# Patient Record
Sex: FEMALE | Race: WHITE | NOT HISPANIC OR LATINO | Employment: UNEMPLOYED | URBAN - METROPOLITAN AREA
[De-identification: names, ages, dates, MRNs, and addresses within clinical notes are randomized per-mention and may not be internally consistent; named-entity substitution may affect disease eponyms.]

---

## 2018-05-31 ENCOUNTER — TELEPHONE (OUTPATIENT)
Dept: INTERNAL MEDICINE | Facility: HOSPITAL | Age: 78
End: 2018-05-31

## 2018-05-31 DIAGNOSIS — E11.9 TYPE 2 DIABETES MELLITUS WITHOUT COMPLICATION, WITHOUT LONG-TERM CURRENT USE OF INSULIN (CMS/HCC): ICD-10-CM

## 2018-05-31 DIAGNOSIS — I10 ESSENTIAL HYPERTENSION: Primary | ICD-10-CM

## 2018-05-31 RX ORDER — METFORMIN HYDROCHLORIDE 1000 MG/1
1000 TABLET ORAL 2 TIMES DAILY
Qty: 180 TABLET | Refills: 1 | Status: SHIPPED | OUTPATIENT
Start: 2018-05-31 | End: 2018-08-16 | Stop reason: SDUPTHER

## 2018-05-31 RX ORDER — METOPROLOL TARTRATE 25 MG/1
12.5 TABLET, FILM COATED ORAL 2 TIMES DAILY
COMMUNITY
Start: 2018-01-15 | End: 2018-07-19 | Stop reason: SDUPTHER

## 2018-05-31 RX ORDER — LISINOPRIL 40 MG/1
40 TABLET ORAL DAILY
COMMUNITY
Start: 2017-06-20 | End: 2018-05-31 | Stop reason: SDUPTHER

## 2018-05-31 RX ORDER — LISINOPRIL 40 MG/1
40 TABLET ORAL DAILY
Qty: 90 TABLET | Refills: 1 | Status: SHIPPED | OUTPATIENT
Start: 2018-05-31 | End: 2018-08-16 | Stop reason: SDUPTHER

## 2018-05-31 RX ORDER — ASPIRIN 81 MG/1
81 TABLET ORAL DAILY
COMMUNITY
End: 2018-08-28 | Stop reason: SDUPTHER

## 2018-05-31 RX ORDER — METFORMIN HYDROCHLORIDE 1000 MG/1
1000 TABLET ORAL 2 TIMES DAILY
COMMUNITY
Start: 2017-06-20 | End: 2018-05-31 | Stop reason: SDUPTHER

## 2018-05-31 RX ORDER — ATORVASTATIN CALCIUM 40 MG/1
40 TABLET, FILM COATED ORAL DAILY
COMMUNITY
Start: 2017-06-20 | End: 2018-07-18 | Stop reason: SDUPTHER

## 2018-05-31 RX ORDER — PNV NO.95/FERROUS FUM/FOLIC AC 28MG-0.8MG
1 TABLET ORAL DAILY
COMMUNITY
Start: 2016-06-16 | End: 2018-08-16 | Stop reason: SDUPTHER

## 2018-05-31 RX ORDER — AMLODIPINE BESYLATE 5 MG/1
5 TABLET ORAL DAILY
COMMUNITY
Start: 2017-12-14 | End: 2018-07-18 | Stop reason: SDUPTHER

## 2018-08-15 DIAGNOSIS — I10 HYPERTENSION, UNSPECIFIED TYPE: ICD-10-CM

## 2018-08-15 RX ORDER — SITAGLIPTIN 50 MG/1
TABLET, FILM COATED ORAL
Qty: 30 TABLET | Refills: 0 | OUTPATIENT
Start: 2018-08-15

## 2018-08-16 DIAGNOSIS — E11.9 TYPE 2 DIABETES MELLITUS WITHOUT COMPLICATION, WITHOUT LONG-TERM CURRENT USE OF INSULIN (CMS/HCC): ICD-10-CM

## 2018-08-16 DIAGNOSIS — I10 HYPERTENSION, UNSPECIFIED TYPE: ICD-10-CM

## 2018-08-16 DIAGNOSIS — I10 ESSENTIAL HYPERTENSION: ICD-10-CM

## 2018-08-16 RX ORDER — METOPROLOL TARTRATE 25 MG/1
12.5 TABLET, FILM COATED ORAL 2 TIMES DAILY
Qty: 60 TABLET | Refills: 0 | OUTPATIENT
Start: 2018-08-16

## 2018-08-16 RX ORDER — PNV NO.95/FERROUS FUM/FOLIC AC 28MG-0.8MG
1 TABLET ORAL DAILY
Qty: 60 TABLET | Refills: 0 | Status: SHIPPED | OUTPATIENT
Start: 2018-08-16 | End: 2018-10-10 | Stop reason: SDUPTHER

## 2018-08-16 RX ORDER — AMLODIPINE BESYLATE 5 MG/1
5 TABLET ORAL DAILY
Qty: 60 TABLET | Refills: 0 | Status: SHIPPED | OUTPATIENT
Start: 2018-08-16 | End: 2018-10-14 | Stop reason: SDUPTHER

## 2018-08-16 RX ORDER — METOPROLOL TARTRATE 25 MG/1
12.5 TABLET, FILM COATED ORAL 2 TIMES DAILY
Qty: 60 TABLET | Refills: 0 | Status: SHIPPED | OUTPATIENT
Start: 2018-08-16 | End: 2018-10-10 | Stop reason: SDUPTHER

## 2018-08-16 RX ORDER — LISINOPRIL 40 MG/1
40 TABLET ORAL DAILY
Qty: 60 TABLET | Refills: 0 | Status: SHIPPED | OUTPATIENT
Start: 2018-08-16 | End: 2018-10-10 | Stop reason: SDUPTHER

## 2018-08-16 RX ORDER — ATORVASTATIN CALCIUM 40 MG/1
40 TABLET, FILM COATED ORAL
Qty: 60 TABLET | Refills: 0 | Status: SHIPPED | OUTPATIENT
Start: 2018-08-16 | End: 2018-10-10 | Stop reason: SDUPTHER

## 2018-08-16 RX ORDER — METFORMIN HYDROCHLORIDE 1000 MG/1
1000 TABLET ORAL 2 TIMES DAILY
Qty: 60 TABLET | Refills: 0 | Status: SHIPPED | OUTPATIENT
Start: 2018-08-16 | End: 2018-10-10 | Stop reason: SDUPTHER

## 2018-08-16 NOTE — TELEPHONE ENCOUNTER
Patient has not had OV in > 1 year (5/2017)  Called patient and left message to schedule appointment at her earliest convenience if [facility] still PCP for refills.

## 2018-08-16 NOTE — TELEPHONE ENCOUNTER
Spoke with patient's daughter, Richa Oliveira. Richa states patient has been travelling and has been unable to schedule appointment. Richa states patient is home and can schedule appointment. I transferred Richa to scheduling. Apt scheduled for 10/10/18. Refilled all meds x 2 months.

## 2018-08-28 RX ORDER — ASPIRIN 81 MG/1
TABLET ORAL
Qty: 90 TABLET | Refills: 3 | Status: SHIPPED | OUTPATIENT
Start: 2018-08-28 | End: 2018-10-10 | Stop reason: SDUPTHER

## 2018-09-19 ENCOUNTER — TELEPHONE (OUTPATIENT)
Dept: INTERNAL MEDICINE | Facility: HOSPITAL | Age: 78
End: 2018-09-19

## 2018-09-19 NOTE — TELEPHONE ENCOUNTER
Pts Son in Law (Bharath) is calling because pt needs some dental Procedures done but they can not be done until pt gets a Letter of Necessity so that she can have this procedure done.  She needs a crown placed for her dentures. I explained to him that pt has not been seen in a year and her primary does not know her current health situation in order to generate this letter. Pt does have an upcomming appointment. Please advise.

## 2018-09-24 NOTE — TELEPHONE ENCOUNTER
Pts son (Bharath) called maxine he sated that he is not able to wait that long to get the letter for pt. He is now asking that you give him a call so he can talk to you about this. His number is listed.

## 2018-10-10 ENCOUNTER — OFFICE VISIT (OUTPATIENT)
Dept: INTERNAL MEDICINE | Facility: HOSPITAL | Age: 78
End: 2018-10-10
Attending: NURSE PRACTITIONER
Payer: COMMERCIAL

## 2018-10-10 ENCOUNTER — TELEPHONE (OUTPATIENT)
Dept: INTERNAL MEDICINE | Facility: HOSPITAL | Age: 78
End: 2018-10-10

## 2018-10-10 ENCOUNTER — APPOINTMENT (OUTPATIENT)
Dept: LAB | Facility: HOSPITAL | Age: 78
End: 2018-10-10
Attending: NURSE PRACTITIONER
Payer: COMMERCIAL

## 2018-10-10 VITALS
RESPIRATION RATE: 16 BRPM | WEIGHT: 109 LBS | HEART RATE: 60 BPM | SYSTOLIC BLOOD PRESSURE: 140 MMHG | HEIGHT: 60 IN | DIASTOLIC BLOOD PRESSURE: 62 MMHG | OXYGEN SATURATION: 100 % | TEMPERATURE: 96.2 F | BODY MASS INDEX: 21.4 KG/M2

## 2018-10-10 DIAGNOSIS — E11.9 TYPE 2 DIABETES MELLITUS WITHOUT COMPLICATION, WITHOUT LONG-TERM CURRENT USE OF INSULIN (CMS/HCC): ICD-10-CM

## 2018-10-10 DIAGNOSIS — I10 HYPERTENSION, UNSPECIFIED TYPE: ICD-10-CM

## 2018-10-10 DIAGNOSIS — R92.8 ABNORMAL MAMMOGRAM: ICD-10-CM

## 2018-10-10 DIAGNOSIS — I10 ESSENTIAL HYPERTENSION: Primary | ICD-10-CM

## 2018-10-10 DIAGNOSIS — I25.10 CORONARY ARTERY DISEASE INVOLVING NATIVE CORONARY ARTERY OF NATIVE HEART WITHOUT ANGINA PECTORIS: ICD-10-CM

## 2018-10-10 DIAGNOSIS — Z23 FLU VACCINE NEED: ICD-10-CM

## 2018-10-10 DIAGNOSIS — E11.9 TYPE 2 DIABETES MELLITUS WITHOUT COMPLICATION, WITHOUT LONG-TERM CURRENT USE OF INSULIN (CMS/HCC): Primary | ICD-10-CM

## 2018-10-10 LAB
ALBUMIN/CREAT UR: NORMAL UG/MG
ANION GAP SERPL CALC-SCNC: 10 MEQ/L (ref 3–15)
BUN SERPL-MCNC: 21 MG/DL (ref 8–20)
CALCIUM SERPL-MCNC: 10.2 MG/DL (ref 8.9–10.3)
CHLORIDE SERPL-SCNC: 109 MEQ/L (ref 98–109)
CHOLEST SERPL-MCNC: 112 MG/DL
CO2 SERPL-SCNC: 26 MEQ/L (ref 22–32)
CREAT SERPL-MCNC: 0.9 MG/DL (ref 0.6–1.1)
CREAT UR-MCNC: 21.1 MG/DL
EST. AVERAGE GLUCOSE BLD GHB EST-MCNC: 128 MG/DL
GFR SERPL CREATININE-BSD FRML MDRD: >60 ML/MIN/1.73M*2
GLUCOSE SERPL-MCNC: 91 MG/DL (ref 70–99)
HBA1C MFR BLD HPLC: 6.1 %
HDLC SERPL-MCNC: 58 MG/DL
HDLC SERPL: 1.9 {RATIO}
LDLC SERPL CALC-MCNC: 46 MG/DL
MICROALBUMIN UR-MCNC: <2 MG/L
NONHDLC SERPL-MCNC: 54 MG/DL
POTASSIUM SERPL-SCNC: 5.5 MEQ/L (ref 3.6–5.1)
SODIUM SERPL-SCNC: 145 MEQ/L (ref 136–144)
TRIGL SERPL-MCNC: 39 MG/DL (ref 30–149)

## 2018-10-10 PROCEDURE — 36415 COLL VENOUS BLD VENIPUNCTURE: CPT

## 2018-10-10 PROCEDURE — 83036 HEMOGLOBIN GLYCOSYLATED A1C: CPT

## 2018-10-10 PROCEDURE — 99214 OFFICE O/P EST MOD 30 MIN: CPT | Mod: 25 | Performed by: NURSE PRACTITIONER

## 2018-10-10 PROCEDURE — 80061 LIPID PANEL: CPT

## 2018-10-10 PROCEDURE — 80048 BASIC METABOLIC PNL TOTAL CA: CPT

## 2018-10-10 PROCEDURE — 82043 UR ALBUMIN QUANTITATIVE: CPT

## 2018-10-10 PROCEDURE — G0008 ADMIN INFLUENZA VIRUS VAC: HCPCS | Performed by: NURSE PRACTITIONER

## 2018-10-10 PROCEDURE — G0009 ADMIN PNEUMOCOCCAL VACCINE: HCPCS | Performed by: NURSE PRACTITIONER

## 2018-10-10 RX ORDER — METOPROLOL TARTRATE 25 MG/1
12.5 TABLET, FILM COATED ORAL 2 TIMES DAILY
Qty: 90 TABLET | Refills: 1 | Status: SHIPPED | OUTPATIENT
Start: 2018-10-10 | End: 2019-05-24 | Stop reason: SDUPTHER

## 2018-10-10 RX ORDER — LISINOPRIL 40 MG/1
40 TABLET ORAL DAILY
Qty: 90 TABLET | Refills: 1 | Status: SHIPPED | OUTPATIENT
Start: 2018-10-10 | End: 2019-05-14 | Stop reason: SDUPTHER

## 2018-10-10 RX ORDER — ATORVASTATIN CALCIUM 40 MG/1
40 TABLET, FILM COATED ORAL
Qty: 90 TABLET | Refills: 3 | Status: SHIPPED | OUTPATIENT
Start: 2018-10-10 | End: 2019-09-12 | Stop reason: SDUPTHER

## 2018-10-10 RX ORDER — PNV NO.95/FERROUS FUM/FOLIC AC 28MG-0.8MG
1 TABLET ORAL DAILY
Qty: 90 TABLET | Refills: 3 | Status: SHIPPED | OUTPATIENT
Start: 2018-10-10 | End: 2020-10-21 | Stop reason: SDUPTHER

## 2018-10-10 RX ORDER — ASPIRIN 81 MG/1
81 TABLET ORAL DAILY
Qty: 90 TABLET | Refills: 3 | Status: SHIPPED | OUTPATIENT
Start: 2018-10-10 | End: 2019-09-12 | Stop reason: SDUPTHER

## 2018-10-10 RX ORDER — METFORMIN HYDROCHLORIDE 1000 MG/1
TABLET ORAL
Qty: 180 TABLET | Refills: 1 | Status: SHIPPED | OUTPATIENT
Start: 2018-10-10 | End: 2019-05-24 | Stop reason: SDUPTHER

## 2018-10-10 NOTE — PATIENT INSTRUCTIONS
Lab work today    Please schedule your breast mammogram and ultrasound  Schedule an appointment with the breast specialist    Return in about 1 month for a nurse visit to get the Shingrix vaccine. Please call our office first to ensure we have in stock

## 2018-10-10 NOTE — TELEPHONE ENCOUNTER
Called patient re: lab work from today.   No answer, left VM:  A1C today 6.1, indicating T2DM very well controlled.  Will d/c januvia.   Asked patient to return my call with any questions and I will attempt to call at later time.

## 2018-10-10 NOTE — LETTER
October 10, 2018     Patient: Grazyna Bejarano   YOB: 1940   Date of Visit: 10/10/2018       To Whom it May Concern:    LETTER OF MEDICAL NECESSITY:  Ms. Grazyna Bejarano is currently under my medical care. She has past medical history of coronary heart disease, hypertension, and type 2 diabetes.   Ms. Bejarano was recently evaluated by a dentist. She has had multiple tooth extraction and will be requiring placement of a crown in order to be properly fitted for her dentures.   It is in my medical opinion that a crown is medically necessary for the dental health and overall health of the Ms. Bejarano in order to ensure proper oral care and nutrition.      If you have any questions or concerns, please don't hesitate to call.         Sincerely,         Yas Velazquez NP        CC: No Recipients

## 2018-10-10 NOTE — PROGRESS NOTES
Subjective      Patient ID: Grazyna Bejarano is a 78 y.o. female.    Patient whose pmh includes CAD, HTN, T2DM, bilateral cataracts, presents for routine follow up.   Patient was last seen 5/2017. Patient is accompanied by son in law.     Had a few dental extraction and will need to be fitted for dentures after she gets a crown.   Son in law states insurance requires letter of medical necessity for crown- without crown, can not get fitted for dentures    Reviewed patient blood sugar and BP log. Her FBS have ranged between . Her blood pressure have consistently been 120s/60s.     Patient states she feels well with no new concerns at this time.     HM  Diagnostic mammogram completed in 2015- birads 4, never completed MRI or f/u with breast surgeon as advised.   DXA 11/2015- osteopenia        The following have been reviewed and updated as appropriate in this visit:  Tobacco       Review of Systems   Constitutional: Negative for fatigue, fever and unexpected weight change.   Eyes: Negative for visual disturbance.   Respiratory: Negative for cough and shortness of breath.    Cardiovascular: Negative for chest pain, palpitations and leg swelling.   Gastrointestinal: Negative for abdominal pain.   Endocrine: Negative for polydipsia, polyphagia and polyuria.   Neurological: Negative for headaches.       No past medical history on file.  Allergies   Allergen Reactions   • No Known Allergies        Current Outpatient Prescriptions   Medication Sig Dispense Refill   • aspirin 81 mg enteric coated tablet Take 1 tablet (81 mg total) by mouth daily. 90 tablet 3   • atorvastatin (LIPITOR) 40 mg tablet Take 1 tablet (40 mg total) by mouth once daily. 90 tablet 3   • blood sugar diagnostic (FREESTYLE LITE STRIPS) strip 1 each by Not Applicable route daily. 100 strip 3   • calcium carbonate-vitamin D3 500 mg(1,250mg) -400 unit tablet Take 1 tablet by mouth daily. 90 tablet 3   • lisinopril (PRINIVIL) 40 mg tablet Take 1  tablet (40 mg total) by mouth daily. 90 tablet 1   • metFORMIN (GLUCOPHAGE) 1,000 mg tablet Take 1/2 tablet in the morning and 1 tablet before bed 180 tablet 1   • metoprolol tartrate (LOPRESSOR) 25 mg tablet Take 0.5 tablets (12.5 mg total) by mouth 2 (two) times a day. 90 tablet 1   • miscellaneous medical supply (BLOOD PRESSURE CUFF) misc 1 each by Not Applicable route daily.       No current facility-administered medications for this visit.        Objective   /62   Pulse 60   Temp (!) 35.7 °C (96.2 °F)   Resp 16   Ht 1.524 m (5')   Wt 49.4 kg (109 lb)   SpO2 100%   BMI 21.29 kg/m²  Body mass index is 21.29 kg/m².    Physical Exam   Constitutional: She is oriented to person, place, and time. She appears well-developed and well-nourished.   HENT:   Mouth/Throat: Oropharynx is clear and moist.   Eyes: Pupils are equal, round, and reactive to light.   Neck: Normal range of motion. Neck supple.   Cardiovascular: Normal rate, regular rhythm, normal heart sounds and intact distal pulses.    Pulmonary/Chest: Effort normal and breath sounds normal.   Abdominal: Soft. Bowel sounds are normal. She exhibits no distension. There is no tenderness.   Musculoskeletal: Normal range of motion.   Neurological: She is alert and oriented to person, place, and time.   Skin: Skin is warm and dry. Capillary refill takes less than 2 seconds. No rash noted.   Psychiatric: She has a normal mood and affect. Her behavior is normal. Judgment and thought content normal.   Vitals reviewed.      Assessment/Plan   Problem List Items Addressed This Visit     CAD (coronary artery disease)    Relevant Medications    metoprolol tartrate (LOPRESSOR) 25 mg tablet    lisinopril (PRINIVIL) 40 mg tablet    atorvastatin (LIPITOR) 40 mg tablet    aspirin 81 mg enteric coated tablet    Type 2 diabetes mellitus (CMS/HCC) (HCC)     DM well controlled; most recent A1C 8.3; her FBS have een very well controlled .   Repeat A1C today; can  likely d/c Januvia.   Continue metformin 1500 mg/day.          Relevant Medications    metFORMIN (GLUCOPHAGE) 1,000 mg tablet    calcium carbonate-vitamin D3 500 mg(1,250mg) -400 unit tablet    atorvastatin (LIPITOR) 40 mg tablet    Other Relevant Orders    Basic metabolic panel (Completed)    Hemoglobin A1c (Completed)    Microalbumin/Creatinine Ur Random (Completed)    Lipid panel (Completed)    Pneumococcal polysaccharide vaccine 23-valent greater than or equal to 1yo subcutaneous/IM (Completed)    Hypertension - Primary     BP slightly above goal on my recheck.   Continue current regimen for now with metoprolol 12.5 mg BID, lisinopril 40 mg QD         Relevant Medications    metoprolol tartrate (LOPRESSOR) 25 mg tablet    lisinopril (PRINIVIL) 40 mg tablet    Other Relevant Orders    Pneumococcal polysaccharide vaccine 23-valent greater than or equal to 1yo subcutaneous/IM (Completed)    Abnormal mammogram     Right diagnostic mammogram 2015- birads 4 for which MRI and f/u with breast surgeon recommended; patient did follow through.   Will repeat bilateral diagnostic mammogram; to determine need for MRI/further f/u pending diagnostic mammogram.          Relevant Orders    Ambulatory referral to General Surgery    BI DIAGNOSTIC MAMMOGRAM BILATERAL    ULTRASOUND BREAST LIMITED RIGHT      Other Visit Diagnoses     Flu vaccine need        Relevant Orders    Influenza vaccine quadrivalent preservative free 6 mon and older IM (FluLaval) (Completed)              Return in about 6 months (around 4/10/2019).  Yas Velazquez NP

## 2018-10-11 ENCOUNTER — TELEPHONE (OUTPATIENT)
Dept: INTERNAL MEDICINE | Facility: HOSPITAL | Age: 78
End: 2018-10-11

## 2018-10-11 DIAGNOSIS — E87.5 HYPERKALEMIA: Primary | ICD-10-CM

## 2018-10-11 NOTE — TELEPHONE ENCOUNTER
Spoke to patient's son in law, Bharath Oliveira re: labs from 10/10/18. A1C 6.1. Will discontinue Januvia. Continue metformin as prescribed.   K+ mildly elevated, 5.5, BUN mildly elevated. Encouraged patient to stay well hydrated. All other labs WNL.   Will repeat BMP in 6 weeks- mailed lab slip.   Son in law verbalized understanding and will relay information to patient.

## 2018-10-12 ASSESSMENT — ENCOUNTER SYMPTOMS
POLYDIPSIA: 0
SHORTNESS OF BREATH: 0
HEADACHES: 0
FEVER: 0
UNEXPECTED WEIGHT CHANGE: 0
POLYPHAGIA: 0
COUGH: 0
PALPITATIONS: 0
FATIGUE: 0
ABDOMINAL PAIN: 0

## 2018-10-12 NOTE — ASSESSMENT & PLAN NOTE
BP slightly above goal on my recheck.   Continue current regimen for now with metoprolol 12.5 mg BID, lisinopril 40 mg QD

## 2018-10-12 NOTE — ASSESSMENT & PLAN NOTE
Right diagnostic mammogram 2015- birads 4 for which MRI and f/u with breast surgeon recommended; patient did follow through.   Will repeat bilateral diagnostic mammogram; to determine need for MRI/further f/u pending diagnostic mammogram.

## 2018-10-12 NOTE — ASSESSMENT & PLAN NOTE
DM well controlled; most recent A1C 8.3; her FBS have een very well controlled .   Repeat A1C today; can likely d/c Brentuvia.   Continue metformin 1500 mg/day.

## 2018-10-14 DIAGNOSIS — I10 HYPERTENSION, UNSPECIFIED TYPE: ICD-10-CM

## 2018-10-15 RX ORDER — AMLODIPINE BESYLATE 5 MG/1
TABLET ORAL
Qty: 90 TABLET | Refills: 1 | Status: SHIPPED | OUTPATIENT
Start: 2018-10-15 | End: 2019-05-24 | Stop reason: SDUPTHER

## 2018-10-15 NOTE — TELEPHONE ENCOUNTER
Amlodipine was not on patient list at last visit on 10/10/18, but in review of last note from 2/2018, it appears patient should be on amlodipine, likely got dropped from patient's medications list. Refilled amlodipine 5 mg QD.

## 2018-11-07 ENCOUNTER — HOSPITAL ENCOUNTER (OUTPATIENT)
Dept: RADIOLOGY | Age: 78
Discharge: HOME | End: 2018-11-07
Attending: NURSE PRACTITIONER
Payer: COMMERCIAL

## 2018-11-07 DIAGNOSIS — R92.8 ABNORMAL MAMMOGRAM: ICD-10-CM

## 2018-11-07 PROCEDURE — G0279 TOMOSYNTHESIS, MAMMO: HCPCS

## 2019-05-14 DIAGNOSIS — I10 ESSENTIAL HYPERTENSION: ICD-10-CM

## 2019-05-14 RX ORDER — LISINOPRIL 40 MG/1
40 TABLET ORAL DAILY
Qty: 90 TABLET | Refills: 1 | Status: SHIPPED | OUTPATIENT
Start: 2019-05-14 | End: 2019-09-12 | Stop reason: SDUPTHER

## 2019-05-16 NOTE — TELEPHONE ENCOUNTER
Lashonda called this patient several times, pt is not returning my calls or calling to make an appt.

## 2019-05-17 NOTE — TELEPHONE ENCOUNTER
I called and spoke to Mr. Vasquez her son in law. She would like to see a female doctor, not a resident. The patient is out of town in [place].

## 2019-05-24 DIAGNOSIS — E11.9 TYPE 2 DIABETES MELLITUS WITHOUT COMPLICATION, WITHOUT LONG-TERM CURRENT USE OF INSULIN (CMS/HCC): ICD-10-CM

## 2019-05-24 DIAGNOSIS — I10 HYPERTENSION, UNSPECIFIED TYPE: ICD-10-CM

## 2019-05-24 DIAGNOSIS — I10 ESSENTIAL HYPERTENSION: ICD-10-CM

## 2019-05-24 RX ORDER — METOPROLOL TARTRATE 25 MG/1
12.5 TABLET, FILM COATED ORAL 2 TIMES DAILY
Qty: 30 TABLET | Refills: 0 | Status: SHIPPED | OUTPATIENT
Start: 2019-05-24 | End: 2019-09-12 | Stop reason: SDUPTHER

## 2019-05-24 RX ORDER — METFORMIN HYDROCHLORIDE 1000 MG/1
TABLET ORAL
Qty: 60 TABLET | Refills: 0 | Status: SHIPPED | OUTPATIENT
Start: 2019-05-24 | End: 2019-08-19 | Stop reason: SDUPTHER

## 2019-05-24 RX ORDER — AMLODIPINE BESYLATE 5 MG/1
5 TABLET ORAL
Qty: 30 TABLET | Refills: 0 | Status: SHIPPED | OUTPATIENT
Start: 2019-05-24 | End: 2019-07-03 | Stop reason: SDUPTHER

## 2019-07-02 DIAGNOSIS — I10 HYPERTENSION, UNSPECIFIED TYPE: ICD-10-CM

## 2019-07-02 NOTE — TELEPHONE ENCOUNTER
Left message for refill of amlodipine. Office staff and Dr. Mcclendon spoke with patient to set up appointment before next refills. I called patient, no answer.

## 2019-07-03 RX ORDER — AMLODIPINE BESYLATE 5 MG/1
5 TABLET ORAL
Qty: 30 TABLET | Refills: 0 | Status: SHIPPED | OUTPATIENT
Start: 2019-07-03 | End: 2019-08-01 | Stop reason: SDUPTHER

## 2019-08-01 DIAGNOSIS — I10 HYPERTENSION, UNSPECIFIED TYPE: ICD-10-CM

## 2019-08-01 NOTE — TELEPHONE ENCOUNTER
Patient requesting refill please verify and send if appropriate, thank you.    Patient still out of town flight was cancelled, set up another appointment 8/21, requesting a 30 day refill until appointment.

## 2019-08-06 RX ORDER — AMLODIPINE BESYLATE 5 MG/1
5 TABLET ORAL
Qty: 30 TABLET | Refills: 0 | Status: SHIPPED | OUTPATIENT
Start: 2019-08-06 | End: 2019-09-12 | Stop reason: SDUPTHER

## 2019-08-19 DIAGNOSIS — E11.9 TYPE 2 DIABETES MELLITUS WITHOUT COMPLICATION, WITHOUT LONG-TERM CURRENT USE OF INSULIN (CMS/HCC): ICD-10-CM

## 2019-08-19 RX ORDER — METFORMIN HYDROCHLORIDE 1000 MG/1
TABLET ORAL
Qty: 60 TABLET | Refills: 0 | Status: SHIPPED | OUTPATIENT
Start: 2019-08-19 | End: 2019-09-12 | Stop reason: SDUPTHER

## 2019-08-19 NOTE — TELEPHONE ENCOUNTER
Please call patient and tell him I will give 1 month refill. He needs to come to office for an appointment.

## 2019-08-27 NOTE — TELEPHONE ENCOUNTER
Pharmacy requesting refill. Please verify and send. Made aware a 30 day supply would be sent and patient needs to establish with new provider    Notification Instructions: Patient will be notified of biopsy results. However, patient instructed to call the office if not contacted within 2 weeks. Type Of Destruction Used: Electrodesiccation and Curettage Dressing: pressure dressing with telfa Consent: Written consent was obtained and risks were reviewed including but not limited to scarring, infection, bleeding, scabbing, incomplete removal, nerve damage and allergy to anesthesia. Detail Level: Detailed Electrodesiccation And Curettage Text: The wound bed was treated with electrodesiccation and curettage after the biopsy was performed. Hemostasis: Drysol Was A Bandage Applied: Yes Biopsy Method: Dermablade Billing Type: [employer] Render In Bullet Format When Appropriate: No Silver Nitrate Text: The wound bed was treated with silver nitrate after the biopsy was performed. Anesthesia Type: 1% lidocaine with epinephrine Curettage Text: The wound bed was treated with curettage after the biopsy was performed. Additional Anesthesia Volume In Cc (Will Not Render If 0): 0 Size Of Lesion In Cm: 1 Lab: [facility] [place] Wound Care: Vaseline Cryotherapy Text: The wound bed was treated with cryotherapy after the biopsy was performed. Depth Of Biopsy: dermis Biopsy Type: H and E Electrodesiccation Text: The wound bed was treated with electrodesiccation after the biopsy was performed. Post-Care Instructions: I reviewed with the patient in detail post-care instructions. Patient is to keep the biopsy site dry overnight, and then apply bacitracin twice daily until healed. Patient may apply hydrogen peroxide soaks to remove any crusting. Anesthesia Volume In Cc (Will Not Render If 0): 0.5 Lab Facility: [address] [facility] Lab: [id number] Billing Type: Third-Party Bill Lab Facility: [id number] Size Of Lesion In Cm: 0.4 Size Of Lesion In Cm: 0.8

## 2019-09-12 ENCOUNTER — OFFICE VISIT (OUTPATIENT)
Dept: INTERNAL MEDICINE | Facility: HOSPITAL | Age: 79
End: 2019-09-12
Attending: STUDENT IN AN ORGANIZED HEALTH CARE EDUCATION/TRAINING PROGRAM
Payer: COMMERCIAL

## 2019-09-12 VITALS
WEIGHT: 98 LBS | SYSTOLIC BLOOD PRESSURE: 162 MMHG | TEMPERATURE: 96.6 F | OXYGEN SATURATION: 99 % | BODY MASS INDEX: 19.24 KG/M2 | HEIGHT: 60 IN | DIASTOLIC BLOOD PRESSURE: 72 MMHG | HEART RATE: 60 BPM | RESPIRATION RATE: 16 BRPM

## 2019-09-12 DIAGNOSIS — I25.10 CORONARY ARTERY DISEASE INVOLVING NATIVE CORONARY ARTERY OF NATIVE HEART WITHOUT ANGINA PECTORIS: ICD-10-CM

## 2019-09-12 DIAGNOSIS — H91.93 BILATERAL HEARING LOSS, UNSPECIFIED HEARING LOSS TYPE: ICD-10-CM

## 2019-09-12 DIAGNOSIS — E11.9 TYPE 2 DIABETES MELLITUS WITHOUT COMPLICATION, WITHOUT LONG-TERM CURRENT USE OF INSULIN (CMS/HCC): Primary | ICD-10-CM

## 2019-09-12 DIAGNOSIS — E11.9 TYPE 2 DIABETES MELLITUS WITHOUT COMPLICATION, WITHOUT LONG-TERM CURRENT USE OF INSULIN (CMS/HCC): ICD-10-CM

## 2019-09-12 DIAGNOSIS — M85.80 OSTEOPENIA, UNSPECIFIED LOCATION: ICD-10-CM

## 2019-09-12 DIAGNOSIS — Z00.00 HEALTHCARE MAINTENANCE: Primary | ICD-10-CM

## 2019-09-12 DIAGNOSIS — I10 ESSENTIAL HYPERTENSION: ICD-10-CM

## 2019-09-12 DIAGNOSIS — M85.89 OSTEOPENIA OF MULTIPLE SITES: ICD-10-CM

## 2019-09-12 DIAGNOSIS — I10 HYPERTENSION, UNSPECIFIED TYPE: ICD-10-CM

## 2019-09-12 PROCEDURE — 99213 OFFICE O/P EST LOW 20 MIN: CPT | Performed by: STUDENT IN AN ORGANIZED HEALTH CARE EDUCATION/TRAINING PROGRAM

## 2019-09-12 RX ORDER — METOPROLOL TARTRATE 25 MG/1
12.5 TABLET, FILM COATED ORAL 2 TIMES DAILY
Qty: 30 TABLET | Refills: 0 | Status: SHIPPED | OUTPATIENT
Start: 2019-09-12 | End: 2019-10-09 | Stop reason: SDUPTHER

## 2019-09-12 RX ORDER — LISINOPRIL 40 MG/1
40 TABLET ORAL DAILY
Qty: 90 TABLET | Refills: 1 | Status: SHIPPED | OUTPATIENT
Start: 2019-09-12 | End: 2020-04-03 | Stop reason: SDUPTHER

## 2019-09-12 RX ORDER — AMLODIPINE BESYLATE 10 MG/1
10 TABLET ORAL
Qty: 30 TABLET | Refills: 6 | Status: SHIPPED | OUTPATIENT
Start: 2019-09-12 | End: 2019-09-18 | Stop reason: SDUPTHER

## 2019-09-12 RX ORDER — ATORVASTATIN CALCIUM 40 MG/1
40 TABLET, FILM COATED ORAL
Qty: 90 TABLET | Refills: 3 | Status: SHIPPED | OUTPATIENT
Start: 2019-09-12 | End: 2021-02-17

## 2019-09-12 RX ORDER — ASPIRIN 81 MG/1
81 TABLET ORAL DAILY
Qty: 90 TABLET | Refills: 3 | Status: SHIPPED | OUTPATIENT
Start: 2019-09-12 | End: 2020-10-07 | Stop reason: SDUPTHER

## 2019-09-12 RX ORDER — METFORMIN HYDROCHLORIDE 1000 MG/1
TABLET ORAL
Qty: 60 TABLET | Refills: 6 | Status: SHIPPED | OUTPATIENT
Start: 2019-09-12 | End: 2019-10-23 | Stop reason: SDUPTHER

## 2019-09-12 NOTE — ASSESSMENT & PLAN NOTE
Elevated on manual repeat.    - Will increase amlodipine to 10mg   - C/w other medications at current doses

## 2019-09-12 NOTE — ASSESSMENT & PLAN NOTE
- Mammo 10/18: birads 2, will reorder repeat  - Due for repeat DEXA   - Due for shingrix, will order to get at pharmacy

## 2019-09-12 NOTE — ASSESSMENT & PLAN NOTE
Last A1C 6.1%. On Metformin 500mg in the morning and 1g in the evening.    - Will recheck A1C today   - Refer to ophthalmology for eye exam  - Refer to podiatry for foot exam

## 2019-09-12 NOTE — PATIENT INSTRUCTIONS
MsYokasta Bejarano,    You presented to [facility] today for a follow-up.     We will be doing the following:    - For your high blood pressure: your medications have been refilled and your amlodipine was increased to 10mg.   - For your diabetes: we have refilled your medication, and will be checking a couple of lab tests. Make sure you schedule an appointment with the eye and foot doctors. You should have your eyes and feet examined every year.   - For your vaccines, please get the shingrix and flu vaccines at your pharmacy.  - For your overall health, we will be checking a mammogram and a DEXA scan (scan to look at your bones). We will also be checking a test to make sure you do not need a colonoscopy.  - We have also referred you to the hearing testing specialists.    Let us know if you have any questions!

## 2019-09-12 NOTE — ASSESSMENT & PLAN NOTE
Patient noted to need things repeated a couple of times at higher volume for her to hear.     - Will refer to audiology for hearing test

## 2019-09-12 NOTE — PROGRESS NOTES
[facility]  Internal Medicine Progress Note       SUBJECTIVE   Grazyna Bejarano is a 79 y.o. year-old female with a past medical history of CAD, DM2, HTN, HLD, osteopenia who presents for a follow-up visit.    Interval History:     Patient feels well. No complaints apart from her son-in-law noting that she may be having some hearing loss with them needing to sometimes shout things for her to hear them. She also needs some medication refills.     She has had a hx of shingles before and reports pain at the site of her shingles. She will try OTC capsaicin cream as instructed and give us a call if that does not work enough.    Otherwise, she is amenable to get shingrix, FIT testing, repeat mammogram, DEXA and a few lab tests for healthcare maintenance.    She feels safe at home. Has no issues otherwise.      REVIEW OF SYSTEMS   Review of Systems: negative unless noted above.      MEDICATIONS        Current Outpatient Prescriptions:   •  amLODIPine (NORVASC) 10 mg tablet, Take 1 tablet (10 mg total) by mouth once daily., Disp: 30 tablet, Rfl: 6  •  aspirin 81 mg enteric coated tablet, Take 1 tablet (81 mg total) by mouth daily., Disp: 90 tablet, Rfl: 3  •  atorvastatin (LIPITOR) 40 mg tablet, Take 1 tablet (40 mg total) by mouth once daily., Disp: 90 tablet, Rfl: 3  •  blood sugar diagnostic (FREESTYLE LITE STRIPS) strip, 1 each by Not Applicable route daily., Disp: 100 strip, Rfl: 3  •  calcium carbonate-vitamin D3 500 mg(1,250mg) -400 unit tablet, Take 1 tablet by mouth daily., Disp: 90 tablet, Rfl: 3  •  lisinopril (PRINIVIL) 40 mg tablet, Take 1 tablet (40 mg total) by mouth daily., Disp: 90 tablet, Rfl: 1  •  metFORMIN (GLUCOPHAGE) 1,000 mg tablet, Take 1/2 tablet in the morning and 1 tablet before bed, Disp: 60 tablet, Rfl: 6  •  metoprolol tartrate (LOPRESSOR) 25 mg tablet, Take 0.5 tablets (12.5 mg total) by mouth 2 (two) times a day., Disp: 30 tablet, Rfl: 0  •  miscellaneous medical supply  (BLOOD PRESSURE CUFF) misc, 1 each by Not Applicable route daily., Disp: , Rfl:   •  varicella-zoster gE-AS01B, PF, (SHINGRIX, PF,) 50 mcg/0.5 mL suspension for reconstitution injection, Inject 0.5 mL (50 mcg total) into the shoulder, thigh, or buttocks once for 1 dose., Disp: 0.5 mL, Rfl: 0    PHYSICAL EXAMINATION     PHYSICAL EXAM  Vitals: BP (!) 162/72 (BP Location: Right upper arm, Patient Position: Sitting)   Pulse 60   Temp (!) 35.9 °C (96.6 °F) (Oral)   Resp 16   Ht 1.524 m (5')   Wt 44.5 kg (98 lb)   SpO2 99%   BMI 19.14 kg/m²    General: NAD, well-appearing   HEENT: Oropharyx clear and without exudates  No JVD  No cervical lymphadenopathy   Eyes: Conjunctiva clear  EOMI   Cardiac: RRR  No murmurs, rubs, or gallops   Pulmonary: Clear to auscultation bilaterally   Abdomen: Soft, non-tender, +BS  No rebound, no guarding   MSK: No joint deformity   Extremities: No peripheral edema   Neuro: AAO x 3, non-focal  CN II-XII intact   Psych: Appropriate, cooperative   Skin: No rashes            LABS / IMAGING/STUDIES      Labs Reviewed.  Studies/Imaging Reviewed.      ASSESSMENT AND PLAN      Problem List Items Addressed This Visit     CAD (coronary artery disease)    Relevant Medications    atorvastatin (LIPITOR) 40 mg tablet    aspirin 81 mg enteric coated tablet    metoprolol tartrate (LOPRESSOR) 25 mg tablet    amLODIPine (NORVASC) 10 mg tablet    lisinopril (PRINIVIL) 40 mg tablet    Type 2 diabetes mellitus (CMS/[facility]) (HCC)     Last A1C 6.1%. On Metformin 500mg in the morning and 1g in the evening.    - Will recheck A1C today   - Refer to ophthalmology for eye exam  - Refer to podiatry for foot exam          Relevant Medications    atorvastatin (LIPITOR) 40 mg tablet    metFORMIN (GLUCOPHAGE) 1,000 mg tablet    Other Relevant Orders    Hemoglobin A1c    Comprehensive metabolic panel    Microalbumin/Creatinine Ur Random    Hypertension     Elevated on manual repeat.    - Will increase amlodipine to 10mg   -  C/w other medications at current doses          Relevant Medications    metoprolol tartrate (LOPRESSOR) 25 mg tablet    amLODIPine (NORVASC) 10 mg tablet    lisinopril (PRINIVIL) 40 mg tablet    Healthcare maintenance - Primary     - Mammo 10/18: birads 2, will reorder repeat  - Due for repeat DEXA   - Due for shingrix, will order to get at pharmacy          Relevant Medications    varicella-zoster [id number], PF, (SHINGRIX, PF,) 50 mcg/0.5 mL suspension for reconstitution injection    Other Relevant Orders    BI SCREENING MAMMOGRAM BILATERAL    CBC    TSH w reflex FT4    Vitamin D 25 hydroxy    DEXA BONE DENSITY    FIT    Osteopenia of multiple sites     Patient with noted osteopenia on DEXA from 2015.     - Will repeat          Bilateral hearing loss     Patient noted to need things repeated a couple of times at higher volume for her to hear.     - Will refer to audiology for hearing test          Relevant Orders    Ambulatory referral to Audiology      Other Visit Diagnoses     Osteopenia, unspecified location        Relevant Orders    DEXA BONE DENSITY           HEALTHCARE MAINTENANCE   Health Maintenance Due   Topic Date Due   • Annual Dilated Retinal Exam  09/09/1950   • Diabetic Foot Exam  09/09/1950   • DTaP, Tdap, and Td Vaccines (1 - Tdap) 09/09/1961   • Annual Falls Risk Screening  09/09/2007   • Zoster Vaccine (2 of 3) 10/22/2015   • DEXA Scan  11/06/2017   • Influenza Vaccine (1) 08/01/2019          Fox Sherwood MD  09/12/19  9:30 AM

## 2019-09-13 LAB
25(OH)D3+25(OH)D2 SERPL-MCNC: 12.3 NG/ML (ref 30–100)
ALBUMIN SERPL-MCNC: 4.5 G/DL (ref 3.5–4.8)
ALBUMIN/CREAT UR: <6 MG/G CREAT (ref 0–30)
ALBUMIN/GLOB SERPL: 1.5 {RATIO} (ref 1.2–2.2)
ALP SERPL-CCNC: 78 IU/L (ref 39–117)
ALT SERPL-CCNC: 23 IU/L (ref 0–32)
AST SERPL-CCNC: 32 IU/L (ref 0–40)
BILIRUB SERPL-MCNC: 0.3 MG/DL (ref 0–1.2)
BUN SERPL-MCNC: 16 MG/DL (ref 8–27)
BUN/CREAT SERPL: 19 (ref 12–28)
CALCIUM SERPL-MCNC: 9.6 MG/DL (ref 8.7–10.3)
CHLORIDE SERPL-SCNC: 106 MMOL/L (ref 96–106)
CO2 SERPL-SCNC: 21 MMOL/L (ref 20–29)
CREAT SERPL-MCNC: 0.83 MG/DL (ref 0.57–1)
CREAT UR-MCNC: 49.7 MG/DL
ERYTHROCYTE [DISTWIDTH] IN BLOOD BY AUTOMATED COUNT: 14.6 % (ref 12.3–15.4)
GLOBULIN SER CALC-MCNC: 3 G/DL (ref 1.5–4.5)
GLUCOSE SERPL-MCNC: 101 MG/DL (ref 65–99)
HBA1C MFR BLD: 6.4 % (ref 4.8–5.6)
HCT VFR BLD AUTO: 38.9 % (ref 34–46.6)
HGB BLD-MCNC: 12 G/DL (ref 11.1–15.9)
LAB CORP EGFR IF AFRICN AM: 79 ML/MIN/1.73
LAB CORP EGFR IF NONAFRICN AM: 68 ML/MIN/1.73
MCH RBC QN AUTO: 28 PG (ref 26.6–33)
MCHC RBC AUTO-ENTMCNC: 30.8 G/DL (ref 31.5–35.7)
MCV RBC AUTO: 91 FL (ref 79–97)
MICROALBUMIN UR-MCNC: <3 UG/ML
PLATELET # BLD AUTO: 197 X10E3/UL (ref 150–450)
POTASSIUM SERPL-SCNC: 5.1 MMOL/L (ref 3.5–5.2)
PROT SERPL-MCNC: 7.5 G/DL (ref 6–8.5)
RBC # BLD AUTO: 4.29 X10E6/UL (ref 3.77–5.28)
SODIUM SERPL-SCNC: 141 MMOL/L (ref 134–144)
T4 FREE SERPL-MCNC: 1.42 NG/DL (ref 0.82–1.77)
TSH SERPL DL<=0.005 MIU/L-ACNC: 0.66 UIU/ML (ref 0.45–4.5)
WBC # BLD AUTO: 5.3 X10E3/UL (ref 3.4–10.8)

## 2019-09-18 ENCOUNTER — TELEPHONE (OUTPATIENT)
Dept: INTERNAL MEDICINE | Facility: HOSPITAL | Age: 79
End: 2019-09-18

## 2019-09-18 DIAGNOSIS — I10 HYPERTENSION, UNSPECIFIED TYPE: ICD-10-CM

## 2019-09-18 RX ORDER — VIT C/E/ZN/COPPR/LUTEIN/ZEAXAN 250MG-90MG
1000 CAPSULE ORAL DAILY
Qty: 90 CAPSULE | Refills: 3 | Status: SHIPPED | OUTPATIENT
Start: 2019-09-18 | End: 2020-11-03 | Stop reason: SDUPTHER

## 2019-09-18 RX ORDER — AMLODIPINE BESYLATE 5 MG/1
5 TABLET ORAL
Qty: 30 TABLET | Refills: 3 | Status: SHIPPED | OUTPATIENT
Start: 2019-09-18 | End: 2019-12-30 | Stop reason: SDUPTHER

## 2019-10-09 DIAGNOSIS — I10 ESSENTIAL HYPERTENSION: ICD-10-CM

## 2019-10-10 RX ORDER — METOPROLOL TARTRATE 25 MG/1
12.5 TABLET, FILM COATED ORAL 2 TIMES DAILY
Qty: 30 TABLET | Refills: 0 | Status: SHIPPED | OUTPATIENT
Start: 2019-10-10 | End: 2019-11-18 | Stop reason: SDUPTHER

## 2019-10-23 DIAGNOSIS — E11.9 TYPE 2 DIABETES MELLITUS WITHOUT COMPLICATION, WITHOUT LONG-TERM CURRENT USE OF INSULIN (CMS/HCC): ICD-10-CM

## 2019-10-23 NOTE — TELEPHONE ENCOUNTER
Refill requested...1 and a half tablets a day so there should be 90 tablets in each refill. Put in for 90 tablets and 3 refills

## 2019-10-24 RX ORDER — METFORMIN HYDROCHLORIDE 1000 MG/1
TABLET ORAL
Qty: 90 TABLET | Refills: 3 | Status: SHIPPED | OUTPATIENT
Start: 2019-10-24 | End: 2020-07-03

## 2019-11-18 DIAGNOSIS — I10 ESSENTIAL HYPERTENSION: ICD-10-CM

## 2019-11-18 NOTE — TELEPHONE ENCOUNTER
Patient requesting med refill,    Requested Prescriptions     Pending Prescriptions Disp Refills   • metoprolol tartrate (LOPRESSOR) 25 mg tablet 30 tablet 0     Sig: Take 0.5 tablets (12.5 mg total) by mouth 2 (two) times a day.     Patient also requesting future refills to avoid calling.

## 2019-11-21 RX ORDER — METOPROLOL TARTRATE 25 MG/1
12.5 TABLET, FILM COATED ORAL 2 TIMES DAILY
Qty: 30 TABLET | Refills: 0 | Status: SHIPPED | OUTPATIENT
Start: 2019-11-21 | End: 2019-12-29 | Stop reason: SDUPTHER

## 2019-12-17 ENCOUNTER — OFFICE VISIT (OUTPATIENT)
Dept: INTERNAL MEDICINE | Facility: HOSPITAL | Age: 79
End: 2019-12-17
Attending: STUDENT IN AN ORGANIZED HEALTH CARE EDUCATION/TRAINING PROGRAM
Payer: COMMERCIAL

## 2019-12-17 VITALS
HEART RATE: 60 BPM | RESPIRATION RATE: 16 BRPM | BODY MASS INDEX: 19.63 KG/M2 | WEIGHT: 100 LBS | SYSTOLIC BLOOD PRESSURE: 148 MMHG | OXYGEN SATURATION: 100 % | TEMPERATURE: 97 F | DIASTOLIC BLOOD PRESSURE: 67 MMHG | HEIGHT: 60 IN

## 2019-12-17 DIAGNOSIS — Z00.00 HEALTHCARE MAINTENANCE: ICD-10-CM

## 2019-12-17 DIAGNOSIS — R05.9 COUGH: Primary | ICD-10-CM

## 2019-12-17 PROCEDURE — 90471 IMMUNIZATION ADMIN: CPT | Performed by: STUDENT IN AN ORGANIZED HEALTH CARE EDUCATION/TRAINING PROGRAM

## 2019-12-17 PROCEDURE — 99213 OFFICE O/P EST LOW 20 MIN: CPT | Mod: GE,25 | Performed by: STUDENT IN AN ORGANIZED HEALTH CARE EDUCATION/TRAINING PROGRAM

## 2019-12-17 PROCEDURE — 3E0234Z INTRODUCTION OF SERUM, TOXOID AND VACCINE INTO MUSCLE, PERCUTANEOUS APPROACH: ICD-10-PCS | Performed by: INTERNAL MEDICINE

## 2019-12-17 RX ORDER — BENZONATATE 100 MG/1
100 CAPSULE ORAL 3 TIMES DAILY PRN
Qty: 30 CAPSULE | Refills: 0 | Status: SHIPPED | OUTPATIENT
Start: 2019-12-17 | End: 2019-12-27

## 2019-12-17 RX ORDER — CETIRIZINE HYDROCHLORIDE 10 MG/1
10 TABLET ORAL NIGHTLY
Qty: 30 TABLET | Refills: 0 | Status: SHIPPED | OUTPATIENT
Start: 2019-12-17 | End: 2021-02-17

## 2019-12-17 NOTE — PROGRESS NOTES
"     [facility]  Internal Medicine Progress Note       SUBJECTIVE   Grazyna Bejarano is a 79 y.o. year-old female with a past medical history of HTN, HLD, DM2, who presents for a same day visit.    She states that for the past week she has had a cough. [facility], where they \"checked her out\" and \"found nothing\" (flu/RSV, auscultation, vital signs). She states that the cough is mostly dry and nagging but sometimes she does bring up some \"creamy\" sputum, whitish/yellow in color. She denies green sputum or bloody sputum. She denies fevers/chills, SOB, orthopnea, LE swelling. She notes that the cough is worse when she lays down. She does not recall any URI symptoms prior to start of the cough. She has no personal hx of active or passive tobacco exposure. Several family members have developed URI's over the past days but not prior to onset of the cough. Also nobody else is coughing. She has not received a TDap vaccine in her adult life. She denies heavy coughing fits followed by vomiting.      REVIEW OF SYSTEMS   Review of Systems   As per subjective. Remainder of a full 10- point review of systems was negative.   MEDICATIONS        Current Outpatient Medications:   •  amLODIPine (NORVASC) 5 mg tablet, Take 1 tablet (5 mg total) by mouth once daily., Disp: 30 tablet, Rfl: 3  •  aspirin 81 mg enteric coated tablet, Take 1 tablet (81 mg total) by mouth daily., Disp: 90 tablet, Rfl: 3  •  atorvastatin (LIPITOR) 40 mg tablet, Take 1 tablet (40 mg total) by mouth once daily., Disp: 90 tablet, Rfl: 3  •  benzonatate (TESSALON PERLES) 100 mg capsule, Take 1 capsule (100 mg total) by mouth 3 (three) times a day as needed for cough for up to 10 days., Disp: 30 capsule, Rfl: 0  •  blood sugar diagnostic (FREESTYLE LITE STRIPS) strip, 1 each by Not Applicable route daily., Disp: 100 strip, Rfl: 3  •  calcium carbonate-vitamin D3 500 mg(1,250mg) -400 unit tablet, Take 1 tablet by mouth daily., " Disp: 90 tablet, Rfl: 3  •  cetirizine (ZyrTEC) 10 mg tablet, Take 1 tablet (10 mg total) by mouth nightly., Disp: 30 tablet, Rfl: 0  •  cholecalciferol, vitamin D3, (VITAMIN D3) 1,000 unit capsule, Take 1 capsule (1,000 Units total) by mouth daily., Disp: 90 capsule, Rfl: 3  •  lisinopril (PRINIVIL) 40 mg tablet, Take 1 tablet (40 mg total) by mouth daily., Disp: 90 tablet, Rfl: 1  •  metFORMIN (GLUCOPHAGE) 1,000 mg tablet, Take 1/2 tablet in the morning and 1 tablet before bed, Disp: 90 tablet, Rfl: 3  •  metoprolol tartrate (LOPRESSOR) 25 mg tablet, Take 0.5 tablets (12.5 mg total) by mouth 2 (two) times a day., Disp: 30 tablet, Rfl: 0  •  miscellaneous medical supply (BLOOD PRESSURE CUFF) misc, 1 each by Not Applicable route daily., Disp: , Rfl:     PHYSICAL EXAMINATION   Vital signs:   Visit Vitals  BP (!) 148/67   Pulse 60   Temp 36.1 °C (97 °F) (Oral)   Resp 16   Ht 1.524 m (5')   Wt 45.4 kg (100 lb)   SpO2 100%   BMI 19.53 kg/m²     Physical Exam   Constitutional:   Frail elderly female in no apparent distress   HENT:   Head: Normocephalic and atraumatic.   Mouth/Throat: Oropharynx is clear and moist. No oropharyngeal exudate.   Eyes: Pupils are equal, round, and reactive to light. EOM are normal.   Neck: Neck supple. No JVD present.   Cardiovascular: Normal rate, regular rhythm and normal heart sounds. Exam reveals no gallop and no friction rub.   No murmur heard.  Pulmonary/Chest: Effort normal and breath sounds normal. No stridor. No respiratory distress. She has no wheezes. She has no rales.   Abdominal: Soft. Bowel sounds are normal. She exhibits no distension. There is no tenderness.   Musculoskeletal: She exhibits no edema, tenderness or deformity.   Lymphadenopathy:     She has no cervical adenopathy.   Neurological: She is alert.   Skin: Skin is warm and dry.   Psychiatric: She has a normal mood and affect. Her behavior is normal. Thought content normal.      LABS / IMAGING/STUDIES      Labs  Reviewed:   Last BMP:  Lab Results   Component Value Date     09/12/2019    K 5.1 09/12/2019     09/12/2019    CO2 21 09/12/2019    BUN 16 09/12/2019    CREATININE 0.83 09/12/2019       Last CBC:  Lab Results   Component Value Date    WBC 5.3 09/12/2019    HGB 12.0 09/12/2019    HCT 38.9 09/12/2019    MCV 91 09/12/2019     09/12/2019       Last Lipids:  Lab Results   Component Value Date    CHOL 112 10/10/2018    HDL 58 10/10/2018    LDLCALC 46 10/10/2018    TRIG 39 10/10/2018       Last three HgbA1c:  Lab Results   Component Value Date    HGBA1C 6.4 (H) 09/12/2019    HGBA1C 6.1 (H) 10/10/2018    HGBA1C 8.3 (H) 04/18/2017       Last Microalbumin:  Lab Results   Component Value Date    MICROALBUR <3.0 09/12/2019       Last TSH:  Lab Results   Component Value Date    TSH 0.664 09/12/2019       Last Vitamin D:  Lab Results   Component Value Date    [id number] 12.3 (L) 09/12/2019       Studies/Imaging Reviewed: I have personally reviewed the patient's pertinent imaging.    ASSESSMENT AND PLAN      Problem List Items Addressed This Visit        Respiratory    Cough - Primary     10-day acute cough likely 2/2 post-nasal drip in s/o URI    - Prescribed tessalon and cetirizine in addition to OTC guaifenesine  - Instructed to get humidifier for her bedroom  - Instructed to call/return to office if fever/chills, increased chest congestion, SOB/throat closing, or if cough does not improve within the next 2 weeks  - If cough does not improve need to consider stopping ACE, trialing PPI, and/or further chest imaging         Relevant Medications    cetirizine (ZyrTEC) 10 mg tablet    benzonatate (TESSALON PERLES) 100 mg capsule       Other    Healthcare maintenance     - TDaP vaccine today         Relevant Orders    Tdap vaccine greater than or equal to 8yo IM           HEALTHCARE MAINTENANCE   Health Maintenance Due   Topic Date Due   • Annual Dilated Retinal Exam  09/09/1952   • Diabetic Foot Exam  09/09/1952    • DTaP, Tdap, and Td Vaccines (1 - Tdap) 09/09/1961   • Annual Falls Risk Screening  09/09/2007   • Zoster Vaccine (2 of 3) 10/22/2015   • DEXA Scan  11/06/2017   • Influenza Vaccine (1) 08/01/2019          Staci Parson MD  12/17/19  12:03 PM

## 2019-12-17 NOTE — PATIENT INSTRUCTIONS
You came here for a same day visit for cough for 10 days.     Your vital signs and your physical exam were normal. Therefore, this is most likely due to a viral infection.     Please continue the over the counter cough medicines to loosen the mucus and suppress the urge to cough. We will also add a medicine (cetirizine) to reduce irritation from mucus in your throat.     If your cough does not improve within approximately 2 weeks or you develop increased mucus, chest congestion, fever, chills or significant shortness of breath, please call us or come to the ED.

## 2019-12-17 NOTE — PROGRESS NOTES
I reviewed the Resident's note and agree with the documented findings and plan of care, except for my comments below or within the additional notes today.    Resolving URI with residual coughing and post nasal drip.   Agree with measures outlined including use of humidifier.        CHRISTOPH Bajwa MD

## 2019-12-17 NOTE — ASSESSMENT & PLAN NOTE
10-day acute cough likely 2/2 post-nasal drip in s/o URI    - Prescribed tessalon and cetirizine in addition to OTC guaifenesine  - Instructed to get humidifier for her bedroom  - Instructed to call/return to office if fever/chills, increased chest congestion, SOB/throat closing, or if cough does not improve within the next 2 weeks  - If cough does not improve need to consider stopping ACE, trialing PPI, and/or further chest imaging

## 2019-12-28 ENCOUNTER — DOCUMENTATION (OUTPATIENT)
Dept: INTERNAL MEDICINE | Facility: HOSPITAL | Age: 79
End: 2019-12-28

## 2019-12-28 NOTE — PROGRESS NOTES
I got a call from the call center to call  concerning medication refill. I tried calling multiple times with no answer. I left a voice message asking her to call back if needed.

## 2019-12-29 DIAGNOSIS — I10 ESSENTIAL HYPERTENSION: ICD-10-CM

## 2019-12-29 RX ORDER — METOPROLOL TARTRATE 25 MG/1
12.5 TABLET, FILM COATED ORAL 2 TIMES DAILY
Qty: 90 TABLET | Refills: 1 | Status: SHIPPED | OUTPATIENT
Start: 2019-12-29 | End: 2020-07-01 | Stop reason: SDUPTHER

## 2019-12-29 NOTE — PROGRESS NOTES
Patient's son calling for refill of metoprolol.  Asking for 90-day supply which was sent to pharmacy.  Told to call back is any issues.

## 2019-12-30 DIAGNOSIS — I10 HYPERTENSION, UNSPECIFIED TYPE: ICD-10-CM

## 2019-12-30 RX ORDER — AMLODIPINE BESYLATE 5 MG/1
5 TABLET ORAL
Qty: 90 TABLET | Refills: 3 | Status: SHIPPED | OUTPATIENT
Start: 2019-12-30 | End: 2021-02-17

## 2019-12-30 RX ORDER — AMLODIPINE BESYLATE 5 MG/1
5 TABLET ORAL
Qty: 30 TABLET | Refills: 3 | Status: SHIPPED | OUTPATIENT
Start: 2019-12-30 | End: 2021-02-17

## 2020-04-03 DIAGNOSIS — I10 ESSENTIAL HYPERTENSION: ICD-10-CM

## 2020-04-03 RX ORDER — LISINOPRIL 40 MG/1
40 TABLET ORAL DAILY
Qty: 90 TABLET | Refills: 1 | Status: SHIPPED | OUTPATIENT
Start: 2020-04-03 | End: 2020-07-07 | Stop reason: SDUPTHER

## 2020-06-29 DIAGNOSIS — I10 ESSENTIAL HYPERTENSION: ICD-10-CM

## 2020-06-29 DIAGNOSIS — E11.9 TYPE 2 DIABETES MELLITUS WITHOUT COMPLICATION, WITHOUT LONG-TERM CURRENT USE OF INSULIN (CMS/HCC): ICD-10-CM

## 2020-07-03 RX ORDER — METFORMIN HYDROCHLORIDE 1000 MG/1
TABLET ORAL
Qty: 135 TABLET | Refills: 2 | Status: SHIPPED | OUTPATIENT
Start: 2020-07-03 | End: 2021-03-29

## 2020-07-03 RX ORDER — METOPROLOL TARTRATE 25 MG/1
12.5 TABLET, FILM COATED ORAL 2 TIMES DAILY
Qty: 90 TABLET | Refills: 1 | Status: SHIPPED | OUTPATIENT
Start: 2020-07-03 | End: 2021-01-04

## 2020-07-07 DIAGNOSIS — I10 ESSENTIAL HYPERTENSION: ICD-10-CM

## 2020-07-07 RX ORDER — LISINOPRIL 40 MG/1
40 TABLET ORAL DAILY
Qty: 90 TABLET | Refills: 1 | Status: SHIPPED | OUTPATIENT
Start: 2020-07-07 | End: 2021-02-17

## 2020-10-07 DIAGNOSIS — I25.10 CORONARY ARTERY DISEASE INVOLVING NATIVE CORONARY ARTERY OF NATIVE HEART WITHOUT ANGINA PECTORIS: ICD-10-CM

## 2020-10-07 RX ORDER — ASPIRIN 81 MG/1
81 TABLET ORAL DAILY
Qty: 90 TABLET | Refills: 3 | Status: SHIPPED | OUTPATIENT
Start: 2020-10-07 | End: 2021-09-10

## 2020-10-21 DIAGNOSIS — E11.9 TYPE 2 DIABETES MELLITUS WITHOUT COMPLICATION, WITHOUT LONG-TERM CURRENT USE OF INSULIN (CMS/HCC): ICD-10-CM

## 2020-10-21 RX ORDER — PNV NO.95/FERROUS FUM/FOLIC AC 28MG-0.8MG
1 TABLET ORAL DAILY
Qty: 90 TABLET | Refills: 3 | Status: SHIPPED | OUTPATIENT
Start: 2020-10-21 | End: 2020-11-04 | Stop reason: ALTCHOICE

## 2020-11-03 NOTE — TELEPHONE ENCOUNTER
Patient called because the vit D3 was incorrectly ordered. The correct medication was selected, please reorder.

## 2020-11-04 RX ORDER — VIT C/E/ZN/COPPR/LUTEIN/ZEAXAN 250MG-90MG
1000 CAPSULE ORAL DAILY
Qty: 90 CAPSULE | Refills: 3 | Status: SHIPPED | OUTPATIENT
Start: 2020-11-04 | End: 2021-11-22

## 2021-01-04 DIAGNOSIS — I10 ESSENTIAL HYPERTENSION: ICD-10-CM

## 2021-01-04 RX ORDER — METOPROLOL TARTRATE 25 MG/1
12.5 TABLET, FILM COATED ORAL 2 TIMES DAILY
Qty: 90 TABLET | Refills: 1 | Status: SHIPPED | OUTPATIENT
Start: 2021-01-04 | End: 2021-02-17 | Stop reason: SDUPTHER

## 2021-01-04 NOTE — TELEPHONE ENCOUNTER
Pharmacy requesting refill, review    Patient:   Grazyna Bejarano  Female, 80 y.o., 1940  Phone:   [phone] (H) ...  PCP:   Kb Guevara DO  Last Weight:   45.4 kg (100 lb)  HM Due?:   Due  Allergies:   No Known Allergies  MRN:   [id number]  Primary Ins.:   [employer]  Pharmacy:   [facility]/PHARMACY #[id number] [facility] [address] [[id number]]  Preferred Lab:   Health system LAB  My Pat List Reminders:   None  Rx Auth Request  Received: Today  Message Contents   Interface, Surescripts In  P [facility] Clinical Support P   Caller: Unspecified (Today,  7:46 AM)             Request received via interface.    (very important)  New medications from outside sources are available for reconciliation.   Requested Prescriptions     Name from pharmacy: METOPROLOL TARTRATE 25 MG TAB         Will file in chart as: metoprolol tartrate (LOPRESSOR) 25 mg tablet    Sig: Take 0.5 tablets (12.5 mg total) by mouth 2 (two) times a day.    Disp:  90 tablet    Refills:  1    Start: 1/4/2021    Class: Normal    Non-formulary For: Essential hypertension    Last ordered: 6 months ago by Kb Guevara DO Last refill: 10/4/2020    Rx #: [id number]    Beta-Blockers Protocol [id number]/04/2021 07:46 AM   Office visit in past 6 months Protocol Details    Blood Pressure on record in the past 6 months     Normal serum creatinine or not on file in past 9 months     No active pregnancy on record     No positive pregnancy test in past 12 months       To be filled at: [facility]/pharmacy #[id number] [facility] [address]         Med Refill

## 2021-02-03 ENCOUNTER — IMMUNIZATIONS (OUTPATIENT)
Dept: FAMILY MEDICINE CLINIC | Facility: HOSPITAL | Age: 81
End: 2021-02-03

## 2021-02-03 DIAGNOSIS — Z23 ENCOUNTER FOR IMMUNIZATION: Primary | ICD-10-CM

## 2021-02-03 PROCEDURE — 91300 SARS-COV-2 / COVID-19 MRNA VACCINE (PFIZER-BIONTECH) 30 MCG: CPT

## 2021-02-03 PROCEDURE — 0001A SARS-COV-2 / COVID-19 MRNA VACCINE (PFIZER-BIONTECH) 30 MCG: CPT

## 2021-02-17 ENCOUNTER — OFFICE VISIT (OUTPATIENT)
Dept: INTERNAL MEDICINE | Facility: HOSPITAL | Age: 81
End: 2021-02-17
Attending: STUDENT IN AN ORGANIZED HEALTH CARE EDUCATION/TRAINING PROGRAM
Payer: COMMERCIAL

## 2021-02-17 ENCOUNTER — APPOINTMENT (OUTPATIENT)
Dept: LAB | Facility: HOSPITAL | Age: 81
End: 2021-02-17
Attending: STUDENT IN AN ORGANIZED HEALTH CARE EDUCATION/TRAINING PROGRAM
Payer: COMMERCIAL

## 2021-02-17 VITALS
SYSTOLIC BLOOD PRESSURE: 140 MMHG | DIASTOLIC BLOOD PRESSURE: 70 MMHG | BODY MASS INDEX: 20.14 KG/M2 | WEIGHT: 103.1 LBS | HEART RATE: 60 BPM | OXYGEN SATURATION: 100 % | TEMPERATURE: 97.8 F

## 2021-02-17 DIAGNOSIS — I10 ESSENTIAL (PRIMARY) HYPERTENSION: ICD-10-CM

## 2021-02-17 DIAGNOSIS — E87.5 HYPERKALEMIA: ICD-10-CM

## 2021-02-17 DIAGNOSIS — M85.89 OSTEOPENIA OF MULTIPLE SITES: ICD-10-CM

## 2021-02-17 DIAGNOSIS — E11.9 TYPE 2 DIABETES MELLITUS WITHOUT COMPLICATION, WITHOUT LONG-TERM CURRENT USE OF INSULIN (CMS/HCC): ICD-10-CM

## 2021-02-17 DIAGNOSIS — Z12.31 SCREENING MAMMOGRAM, ENCOUNTER FOR: ICD-10-CM

## 2021-02-17 DIAGNOSIS — I10 ESSENTIAL HYPERTENSION: ICD-10-CM

## 2021-02-17 DIAGNOSIS — E55.9 VITAMIN D DEFICIENCY, UNSPECIFIED: ICD-10-CM

## 2021-02-17 DIAGNOSIS — I25.10 CORONARY ARTERY DISEASE INVOLVING NATIVE CORONARY ARTERY OF NATIVE HEART WITHOUT ANGINA PECTORIS: Primary | ICD-10-CM

## 2021-02-17 DIAGNOSIS — Z00.00 HEALTHCARE MAINTENANCE: ICD-10-CM

## 2021-02-17 DIAGNOSIS — E11.9 TYPE 2 DIABETES MELLITUS WITHOUT COMPLICATIONS (CMS/HCC): ICD-10-CM

## 2021-02-17 DIAGNOSIS — Z00.00 ENCOUNTER FOR GENERAL ADULT MEDICAL EXAMINATION WITHOUT ABNORMAL FINDINGS: ICD-10-CM

## 2021-02-17 DIAGNOSIS — E55.9 VITAMIN D DEFICIENCY: ICD-10-CM

## 2021-02-17 PROCEDURE — 99214 OFFICE O/P EST MOD 30 MIN: CPT | Mod: GC | Performed by: STUDENT IN AN ORGANIZED HEALTH CARE EDUCATION/TRAINING PROGRAM

## 2021-02-17 RX ORDER — BLOOD-GLUCOSE METER
1 KIT MISCELLANEOUS DAILY
Qty: 100 STRIP | Refills: 3 | Status: SHIPPED | OUTPATIENT
Start: 2021-02-17 | End: 2022-12-12 | Stop reason: SDUPTHER

## 2021-02-17 RX ORDER — AMLODIPINE BESYLATE 5 MG/1
5 TABLET ORAL DAILY
COMMUNITY
End: 2021-03-15

## 2021-02-17 ASSESSMENT — PAIN SCALES - GENERAL: PAINLEVEL: 0-NO PAIN

## 2021-02-17 ASSESSMENT — PATIENT HEALTH QUESTIONNAIRE - PHQ9: SUM OF ALL RESPONSES TO PHQ9 QUESTIONS 1 & 2: 0

## 2021-02-17 NOTE — ASSESSMENT & PLAN NOTE
No chest pain, or anginal symptoms    - Continue ASA, statin was discontinued due to risk-benefit profile in her age group

## 2021-02-17 NOTE — PROGRESS NOTES
"     [facility]  Internal Medicine Progress Note       SUBJECTIVE   Grazyna Bejarano is a 80 y.o. year-old female with a past medical history of HTN, HLD, DM2, osteopenia, who presents for healthcare maintenance.    Interval History:    DM2  She reports checking her BG \"off and on\" and it always reads \"normal\". She is compliant with metformin 500 in am and 1000 in pm.  She has not seen an ophthalmologist or podiatrist in the past year.  Her daughter does check her feet for wounds and lotions/massages them regularly.    HTN  She very rarely checks her blood pressure.  States it is \"always normal\".  She is compliant with her blood pressure medication which is currently amlodipine 5 mg daily alone.    CAD  She denies any chest pain, shortness of breath, syncope.  She was taken off of atorvastatin since her last office visit.    Osteopenia  As per DEXA scan 2015. Due for repeat. No history of falls.     Vitamin D deficiency  Compliant with her vitamin D supplements.  No recent blood level.    HCM  Her last mammogram was 2018,   BI-RADS 2.  She already received the first dose of the COVID-19 vaccine (Pfizer) on 2/3.  She is up-to-date with all other vaccinations, except for shingles.  Medicare wellness/fall assessment due.     REVIEW OF SYSTEMS   Review of Systems  As per HPI. Remainder of a full 10- point review of systems was negative.     MEDICATIONS        Current Outpatient Medications:   •  amLODIPine (NORVASC) 5 mg tablet, Take 5 mg by mouth daily., Disp: , Rfl:   •  aspirin 81 mg enteric coated tablet, Take 1 tablet (81 mg total) by mouth daily., Disp: 90 tablet, Rfl: 3  •  blood sugar diagnostic (FREESTYLE LITE STRIPS) strip, 1 each by Not Applicable route daily., Disp: 100 strip, Rfl: 3  •  cholecalciferol, vitamin D3, (VITAMIN D3) 25 mcg (1,000 unit) capsule, Take 1 capsule (1,000 Units total) by mouth daily., Disp: 90 capsule, Rfl: 3  •  metFORMIN (GLUCOPHAGE) 1,000 mg tablet, TAKE 1/2 TABLET " IN THE MORNING AND 1 TABLET BEFORE BED, Disp: 135 tablet, Rfl: 2  •  metoprolol tartrate (LOPRESSOR) 12.5 mg, Take 1 split tablet (12.5 mg total) by mouth 2 (two) times a day., Disp: 90 tablet, Rfl: 3  •  miscellaneous medical supply (BLOOD PRESSURE CUFF) misc, 1 each by Not Applicable route daily., Disp: , Rfl:     PHYSICAL EXAMINATION   Vital signs:   Visit Vitals  /70   Pulse 60   Temp 36.6 °C (97.8 °F)   Wt 46.8 kg (103 lb 1.6 oz)   SpO2 100%   BMI 20.14 kg/m²     Physical Exam  Constitutional:       Appearance: She is normal weight.      Comments: Elderly female, appears younger than stated age   HENT:      Head: Normocephalic and atraumatic.      Right Ear: Tympanic membrane and external ear normal.      Left Ear: Tympanic membrane and external ear normal.      Nose: Nose normal.      Mouth/Throat:      Mouth: Mucous membranes are dry.   Eyes:      Extraocular Movements: Extraocular movements intact.      Pupils: Pupils are equal, round, and reactive to light.   Neck:      Musculoskeletal: Normal range of motion and neck supple.   Cardiovascular:      Rate and Rhythm: Normal rate and regular rhythm.      Heart sounds: Normal heart sounds. No murmur. No friction rub. No gallop.    Pulmonary:      Effort: Pulmonary effort is normal.      Breath sounds: Normal breath sounds. No wheezing, rhonchi or rales.   Abdominal:      General: Abdomen is flat. Bowel sounds are normal. There is no distension.      Palpations: Abdomen is soft.      Tenderness: There is no abdominal tenderness.   Musculoskeletal:         General: No swelling or tenderness.   Skin:     General: Skin is warm and dry.      Comments: Skin of bilateral feet/soles intact   Neurological:      General: No focal deficit present.      Mental Status: She is alert and oriented to person, place, and time.   Psychiatric:         Mood and Affect: Mood normal.         Behavior: Behavior normal.         Thought Content: Thought content normal.        LABS  / IMAGING/STUDIES      Labs Reviewed:   Last BMP:  Lab Results   Component Value Date     09/12/2019    K 5.1 09/12/2019     09/12/2019    CO2 21 09/12/2019    BUN 16 09/12/2019    CREATININE 0.83 09/12/2019       Last CBC:  Lab Results   Component Value Date    WBC 5.3 09/12/2019    HGB 12.0 09/12/2019    HCT 38.9 09/12/2019    MCV 91 09/12/2019     09/12/2019       Last Lipids:  Lab Results   Component Value Date    CHOL 112 10/10/2018    HDL 58 10/10/2018    LDLCALC 46 10/10/2018    TRIG 39 10/10/2018       Last three HgbA1c:  Lab Results   Component Value Date    HGBA1C 6.4 (H) 09/12/2019    HGBA1C 6.1 (H) 10/10/2018    HGBA1C 8.3 (H) 04/18/2017       Last Microalbumin:  Lab Results   Component Value Date    MICROALBUR <3.0 09/12/2019       Last TSH:  Lab Results   Component Value Date    TSH 0.664 09/12/2019       Last Vitamin D:  Lab Results   Component Value Date    [id number] 12.3 (L) 09/12/2019       Studies/Imaging Reviewed: I have personally reviewed the patient's pertinent imaging.    ASSESSMENT AND PLAN      Problem List Items Addressed This Visit        Circulatory    CAD (coronary artery disease) - Primary     No chest pain, or anginal symptoms    - Continue ASA, statin was discontinued due to risk-benefit profile in her age group         Relevant Medications    amLODIPine (NORVASC) 5 mg tablet    metoprolol tartrate (LOPRESSOR) 12.5 mg    Hypertension     Initial  on arrival, manual repeat 140/70  Acceptable range, suspect initial value reflects white coat HTN    - Continue amlodipine 5 mg daily  - Encouraged frequent home BP checks, she has a CP cuff at home         Relevant Medications    amLODIPine (NORVASC) 5 mg tablet    metoprolol tartrate (LOPRESSOR) 12.5 mg    Other Relevant Orders    Basic metabolic panel       Digestive    Vitamin D deficiency     - Repeat vit D level         Relevant Orders    Vitamin D 1,25-Dihydroxy       Musculoskeletal    Osteopenia of multiple  sites     T scores low in 2015    - Continue Ca vitamin D  - Repeat DEXA         Relevant Orders    DEXA BONE DENSITY       Endocrine/Metabolic    Type 2 diabetes mellitus (CMS/HCC)     Last A1c levels have been in prediabetic range on metformin 500-0-1000  She checks her BG only rarely    - Continue same dose metformin for now  - Sent test strips for glucometer  - Check A1c, microalb/Cr  - Ophtho and podiatry referral         Relevant Medications    blood sugar diagnostic (FREESTYLE LITE STRIPS) strip    Other Relevant Orders    Hemoglobin A1c    Microalbumin/Creatinine Ur Random    Ambulatory referral to Ophthalmology    Ambulatory referral to Podiatry    Basic metabolic panel    Hyperkalemia     Previously noted    - Check BMP            Other    Healthcare maintenance     Her last mammogram was 2018,   BI-RADS 2.  She already received the first dose of the COVID-19 vaccine (Pfizer) on 2/3.  She is up-to-date with all other vaccinations, except for shingles.  Medicare wellness/full assessment completed by MA this visit.    - Mammo and DEXA scripts  - She will follow up at vaccination location for 2nd dose of SARS-CoV-2 vaccine  - Will need shingrix after that, but will delay for now  - Medicare/fall assessment completed         Relevant Orders    Hepatitis C antibody    Basic metabolic panel    Screening mammogram, encounter for    Relevant Orders    BI SCREENING MAMMOGRAM BILATERAL(TOMOSYNTHESIS)           HEALTHCARE MAINTENANCE   Health Maintenance Due   Topic Date Due   • Annual Dilated Retinal Exam  09/09/1952   • Diabetic Foot Exam  09/09/1952   • COVID-19 Vaccine (1 of 2) 09/09/1958   • Hepatitis C Screening  09/09/1960   • Zoster Vaccine (2 of 3) 10/22/2015   • DEXA Scan  11/06/2017   • Hemoglobin A1C  09/12/2020   • Urine Protein Screening  09/12/2020   • Annual Falls Risk Screening  01/01/2021          Staci Parson MD  02/17/21  3:34 PM

## 2021-02-17 NOTE — ASSESSMENT & PLAN NOTE
Initial  on arrival, manual repeat 140/70  Acceptable range, suspect initial value reflects white coat HTN    - Continue amlodipine 5 mg daily  - Encouraged frequent home BP checks, she has a CP cuff at home

## 2021-02-17 NOTE — PROGRESS NOTES
I performed a history and physical examination of the patient and discussed the management with the Resident. I reviewed the Resident's note and agree with the documented findings and plan of care, except for my comments below or within the additional notes today.    Here with her son.  NAD, able to stand up without holding on to chair.  140/70 on repeat. Took her BP pills today.    Agree with DXA, mammo, labs. Is in-between covid shots--advised she could start the shingrix zoster series at her pharmacy >2 wks after she completes her second covid shot.    CHRISTOPH Bajwa MD

## 2021-02-17 NOTE — PATIENT INSTRUCTIONS
Please get your blood work done on your way out today. We will contact you with the results.    Please make appointments with the eye and foot doctor for diabetes care.    Please schedule you DEXA and mammogram.    Please check your Blood pressure at least every other day at home.     Please continue all your medications.    Please get your shingrix vaccine at least 2 weeks after your second COVID-19 vaccine.

## 2021-02-17 NOTE — ASSESSMENT & PLAN NOTE
Last A1c levels have been in prediabetic range on metformin 500-0-1000  She checks her BG only rarely    - Continue same dose metformin for now  - Sent test strips for glucometer  - Check A1c, microalb/Cr  - Ophtho and podiatry referral

## 2021-02-17 NOTE — ASSESSMENT & PLAN NOTE
Her last mammogram was 2018,   BI-RADS 2.  She already received the first dose of the COVID-19 vaccine (Pfizer) on 2/3.  She is up-to-date with all other vaccinations, except for shingles.  Medicare wellness/full assessment completed by MA this visit.    - Mammo and DEXA scripts  - She will follow up at vaccination location for 2nd dose of SARS-CoV-2 vaccine  - Will need shingrix after that, but will delay for now  - Medicare/fall assessment to be completed, phone encounter will be set up

## 2021-02-19 LAB
ALBUMIN/CREAT UR: 13 MG/G CREAT (ref 0–29)
BUN SERPL-MCNC: 21 MG/DL (ref 8–27)
BUN/CREAT SERPL: 22 (ref 12–28)
CALCIUM SERPL-MCNC: 9.6 MG/DL (ref 8.7–10.3)
CHLORIDE SERPL-SCNC: 104 MMOL/L (ref 96–106)
CO2 SERPL-SCNC: 23 MMOL/L (ref 20–29)
CREAT SERPL-MCNC: 0.94 MG/DL (ref 0.57–1)
CREAT UR-MCNC: 36 MG/DL
GLUCOSE SERPL-MCNC: 103 MG/DL (ref 65–99)
HBA1C MFR BLD: 5.8 % (ref 4.8–5.6)
HCV AB S/CO SERPL IA: <0.1 S/CO RATIO (ref 0–0.9)
LAB CORP EGFR IF AFRICN AM: 67 ML/MIN/1.73
LAB CORP EGFR IF NONAFRICN AM: 58 ML/MIN/1.73
MICROALBUMIN UR-MCNC: 4.5 UG/ML
POTASSIUM SERPL-SCNC: 4.9 MMOL/L (ref 3.5–5.2)
SODIUM SERPL-SCNC: 141 MMOL/L (ref 134–144)

## 2021-02-22 LAB — 1,25(OH)2D SERPL-MCNC: 32 PG/ML (ref 19.9–79.3)

## 2021-02-23 ENCOUNTER — IMMUNIZATIONS (OUTPATIENT)
Dept: FAMILY MEDICINE CLINIC | Facility: HOSPITAL | Age: 81
End: 2021-02-23

## 2021-02-23 DIAGNOSIS — Z23 ENCOUNTER FOR IMMUNIZATION: Primary | ICD-10-CM

## 2021-02-23 PROCEDURE — 0002A SARS-COV-2 / COVID-19 MRNA VACCINE (PFIZER-BIONTECH) 30 MCG: CPT

## 2021-02-23 PROCEDURE — 91300 SARS-COV-2 / COVID-19 MRNA VACCINE (PFIZER-BIONTECH) 30 MCG: CPT

## 2021-03-04 ENCOUNTER — OFFICE VISIT (OUTPATIENT)
Dept: INTERNAL MEDICINE | Facility: HOSPITAL | Age: 81
End: 2021-03-04
Attending: STUDENT IN AN ORGANIZED HEALTH CARE EDUCATION/TRAINING PROGRAM
Payer: COMMERCIAL

## 2021-03-04 VITALS
HEIGHT: 60 IN | TEMPERATURE: 97.2 F | SYSTOLIC BLOOD PRESSURE: 151 MMHG | BODY MASS INDEX: 19.93 KG/M2 | DIASTOLIC BLOOD PRESSURE: 65 MMHG | WEIGHT: 101.5 LBS | HEART RATE: 57 BPM | OXYGEN SATURATION: 100 % | RESPIRATION RATE: 16 BRPM

## 2021-03-04 DIAGNOSIS — E11.9 TYPE 2 DIABETES MELLITUS WITHOUT COMPLICATION, WITHOUT LONG-TERM CURRENT USE OF INSULIN (CMS/HCC): ICD-10-CM

## 2021-03-04 DIAGNOSIS — Z00.00 MEDICARE ANNUAL WELLNESS VISIT, INITIAL: Primary | ICD-10-CM

## 2021-03-04 PROCEDURE — G0438 PPPS, INITIAL VISIT: HCPCS | Performed by: NURSE PRACTITIONER

## 2021-03-04 ASSESSMENT — ACTIVITIES OF DAILY LIVING (ADL)
ADEQUATE_TO_COMPLETE_ADL: YES
ASSISTIVE_DEVICE: EYEGLASSES
PATIENT'S MEMORY ADEQUATE TO SAFELY COMPLETE DAILY ACTIVITIES?: YES

## 2021-03-04 ASSESSMENT — PAIN SCALES - GENERAL: PAINLEVEL: 0-NO PAIN

## 2021-03-04 ASSESSMENT — MINI COG
COMPLETED: YES
TOTAL SCORE: 1

## 2021-03-04 ASSESSMENT — PATIENT HEALTH QUESTIONNAIRE - PHQ9: SUM OF ALL RESPONSES TO PHQ9 QUESTIONS 1 & 2: 0

## 2021-03-04 NOTE — PATIENT INSTRUCTIONS
Your Personalized Prevention Plan Services (PPPS)    Preventive Services Checklist (Assumes Average Risk Unless Otherwise Noted):    Health Maintenance Topics with due status: Overdue       Topic Date Due    Annual Dilated Retinal Exam Never done    Diabetic Foot Exam Never done    Zoster Vaccine 10/22/2015    DEXA Scan 11/06/2017    COVID-19 Vaccine 02/24/2021     Health Maintenance Topics with due status: Not Due       Topic Last Completion Date    DTaP, Tdap, and Td Vaccines 12/17/2019    Hemoglobin A1C 02/17/2021    Urine Protein Screening 02/17/2021     Health Maintenance Topics with due status: Completed       Topic Last Completion Date    Pneumococcal (65 years and older) 10/10/2018    Influenza Vaccine 11/02/2020    Hepatitis C Screening 02/17/2021    Annual Falls Risk Screening 03/04/2021     Health Maintenance Topics with due status: Aged Out       Topic Date Due    Meningococcal ACWY Aged Out    HIB Vaccines Aged Out    IPV Vaccines Aged Out    HPV Vaccines Aged Out    Pneumococcal Aged Out     Health Maintenance Topics with due status: Discontinued       Topic Date Due    Varicella Vaccines Discontinued       You May Be Eligible for These Additional Preventive Services   (Assumes Average Risk Unless Otherwise Noted)  Diabetes Screening Any 1 risk factor: hypertension, dyslipidemia, obesity, high glucose; or Any 2 risk factors: >=64yo, overweight, family history diabetes (covered every 6 months)   Hepatitis C Screening Any 1 risk factor: 1) blood transfusion before 1992,   2) current or past injection drug use (annually for high risk; if born between 4124-7379, see above for status).   Vaccine: Hepatitis B As necessary if at-risk: hemophilia, ESRD, diabetes, living with individual infected with hep B, healthcare worker with frequent contact with blood/bodily fluids (series covered once)   Sexually Transmitted Diseases (STDs) As necessary chlamydia, gonorrhea, syphilis, hepatitis B  (covered annually)  HIV if any 1 risk factor present: 1) <14yo or >66yo and at increased risk or 2) 15-66yo and ask for it (covered annually)   Lung Cancer Screening Low dose chest CT if all 3 risk factors: 1) 55-78yo, 2) smoker or quit within last 15y, 3) >=30 pack years (covered annually).  No results found for this or any previous visit.     Cholesterol Screening Both risk factors: 1) >=19yo and 2)  increased risk coronary artery disease (covered every 5 years).     Breast Cancer Screening Covered once 35-38yo, annually >=39yo (if >=49yo, see above for status).     Glaucoma Screening Any 1 risk factor: 1) diabetes, 2) family history glaucoma, 3)  >=49yo, 4)  American >=66yo (covered annually)           Health Risk Factors with Personalized Education:  ----------------------------------------------------------------------------------------------------------------------  Controlling Your Blood Pressure  · Maintain a normal weight (body mass index between 18.5 and 24.9).  · Eat more fruit, vegetables and low-fat dairy.  · Eat less saturated fat and total fat.  · Lower your sodium (salt) intake.  Try to stay under 1500 mg per day, but if you cannot get your intake to be that low, at least lower it by 1000 mg.  · Stay active.  Try to get at least 90 to 150 minutes of exercise per week.  Try brisk walking, swimming, bicycling or dancing.  · Limit alcohol intake.  When you do consume alcohol, drink no more than 1 drink per day.  · If you have been prescribed medication, take it regularly and exactly as prescribed.  Let your PCP know if you have any problems or questions about your medication.  · Check your blood pressure at home or at the store.  Write down your readings and share them with your PCP  ----------------------------------------------------------------------------------------------------------------------  Controlling Your Diabetes  · Stress can raise your blood sugar.  Learn what  causes your stress.  Learn to lower your stress by spending time with family and friends, exercising, deep breathing, trying meditation or yoga, enjoying hobbies and getting enough rest.  Let your PCP know if you’re having problems limiting your stress.  · Maintain a healthy weight by balancing your diet and exercise.  · Choose foods that are lower in calories, saturated fat, trans fat, sugar and salt.  Eat foods with more fiber, like whole grain cereals, bread, crackers, rice or pasta.  Choose to eat fruit, vegetables, and low-fat or fat-free/skim dairy.  · Reduce the portion size of your meals.  Make half of your meal vegetables and fruit, and divide the other half between lean protein and whole grains.  · Drink water instead of juice and regular soda.  · Spend at least some time being active on most days of the week.  · Work to increase your muscle strength at least twice per week.  Try things like light weights, stretch bands, yoga, heavy gardening (digging, planting with tools) or push-ups.  · If you have been prescribed medication, take it regularly and exactly as prescribed.  Let your PCP know if you have any problems or questions about your medication.  · If you have been asked to check your blood sugar, write down your readings and share them with your PCP  ----------------------------------------------------------------------------------------------------------------------  Controlling Your Osteopenia, Strengthening Your Bones  · Try to get at least 90 to 150 minutes of weight-bearing exercise per week.  · Ensure intake of at least 1200mg of calcium per day.  Eat foods high in calcium like milk and other dairy, green vegetables, fruit, canned fish with soft and edible bones, nuts, calcium-set tofu.  Some foods are calcium-fortified, like bread, cereal, fruit juices and mineral water.  · Help your body make vitamin D by getting 10-15 minutes per day of sunlight.    · Ensure intake of at least 600IU of  vitamin D per day.  Eat foods high in vitamin D like oily fish (salmon, sardines, mackerel) and eggs.  Some foods are fortified with vitamin D, like dairy and cereals.  · Avoid high amounts of caffeine and salt, since they can cause the body to loose calcium.  · Limit alcohol intake, since it is associated with weaker bones and is associated with falls and fractures.  · Limit intake of fizzy drinks.  · If you have been prescribed medication, take it regularly and exactly as prescribed.  Let your PCP know if you have any problems or questions about your medication  ----------------------------------------------------------------------------------------------------------------------  Reducing Your Risk of Falls  · Tell your PCP if any of your medications make you feel tired, dizzy, lightheaded or off-balance.  · Maintain coordination, flexibility and balance by ensuring regular physical activity.  · Limit alcohol intake to 1 drink per day.  Consider avoiding all alcohol intake.  · Ensure good vision.  Visit an ophthalmologist or optometrist regularly for vision screening or to make sure your glasses / contact lens prescription is correct.  If you need glasses or contacts, wear them.  When you get new glasses or contacts, take time to get used to them.  Do not wear sunglasses or tinted lenses when indoors.  · Ensure good hearing.  Have your hearing checked if you are having trouble hearing, or family and friends think you cannot hear them.  If you need a hearing aid, be sure it fits well and wear it.  · Get enough rest.  Ensure about 7-9 hours of sleep every day.  · Get up slowly from your bed or chairs.  Do not start walking until you are sure you feel steady.  · Wear non-skid, rubber-soled, low-heeled shoes.  Do not walk in socks, or in shoes and slippers with smooth soles.  · If your PCP or therapist recommends using a cane or walker, use it regularly.  · Make your home safer.  Increase lighting throughout the  house, especially at the top and bottom of stairs.  Ensure lighting is easily turned on when getting up in the middle of the night.  Make sure there are two secure rails on all stairs.  Install grab bars in the bathtub / shower and near the toilet.  Consider using a shower chair and / or a hand-held shower.  · Spread sand or salt on icy surfaces.  Beware of wet surfaces, which can be icy.  · Tell your PCP if you have fallen.

## 2021-03-04 NOTE — PROGRESS NOTES
Subjective     Grazyna Bejarano is a 80 y.o. female who presents for a subsequent annual wellness visit. Pt presented today with her son-in-law. Pt states she is in very good health.  She feels that she is spoiled and well taken care of by her family. She lives with her daughter, son-in-law and three grandchildren. She states they all provide assistance when needed. She is able to perform her ADLs independently at home, but does require assistance with transportation and shopping.     Patient Care Team:  Kb Guevara DO as PCP - General (Internal Medicine)        Comprehensive Medical and Social History  Patient Active Problem List   Diagnosis   • CAD (coronary artery disease)   • Cataracts, bilateral   • Type 2 diabetes mellitus (CMS/HCC)   • Hypertension   • Abnormal mammogram   • Hyperkalemia   • Healthcare maintenance   • Osteopenia of multiple sites   • Bilateral hearing loss   • Cough   • Vitamin D deficiency   • Screening mammogram, encounter for     Past Medical History:   Diagnosis Date   • Coronary artery disease    • Hypertension    • Type 2 diabetes mellitus (CMS/HCC)      History reviewed. No pertinent surgical history.  Allergies   Allergen Reactions   • No Known Allergies      Current Outpatient Medications   Medication Sig Dispense Refill   • amLODIPine (NORVASC) 5 mg tablet Take 5 mg by mouth daily.     • aspirin 81 mg enteric coated tablet Take 1 tablet (81 mg total) by mouth daily. 90 tablet 3   • blood sugar diagnostic (FREESTYLE LITE STRIPS) strip 1 each by Not Applicable route daily. 100 strip 3   • cholecalciferol, vitamin D3, (VITAMIN D3) 25 mcg (1,000 unit) capsule Take 1 capsule (1,000 Units total) by mouth daily. 90 capsule 3   • metFORMIN (GLUCOPHAGE) 1,000 mg tablet TAKE 1/2 TABLET IN THE MORNING AND 1 TABLET BEFORE  tablet 2   • metoprolol tartrate (LOPRESSOR) 12.5 mg Take 1 split tablet (12.5 mg total) by mouth 2 (two) times a day. 90 tablet 3   • miscellaneous medical  supply (BLOOD PRESSURE CUFF) misc 1 each by Not Applicable route daily.       No current facility-administered medications for this visit.      Social History     Tobacco Use   • Smoking status: Never Smoker   • Smokeless tobacco: Never Used   Substance Use Topics   • Alcohol use: No   • Drug use: No     Family History   Problem Relation Age of Onset   • Heart disease Biological Brother        Objective   Vitals  Vitals:    03/04/21 1115   BP: (!) 151/65   BP Location: Right upper arm   Patient Position: Sitting   Pulse: (!) 57   Resp: 16   Temp: 36.2 °C (97.2 °F)   TempSrc: Temporal   SpO2: 100%   Weight: 46 kg (101 lb 8 oz)   Height: 1.524 m (5')   PainSc: 0-No pain     Body mass index is 19.82 kg/m².    Physical Exam  Constitutional:       Appearance: Normal appearance. She is not ill-appearing or toxic-appearing.   HENT:      Head: Normocephalic.   Neck:      Musculoskeletal: Neck supple.   Cardiovascular:      Rate and Rhythm: Normal rate and regular rhythm.      Pulses: Normal pulses.      Heart sounds: Normal heart sounds.   Pulmonary:      Effort: Pulmonary effort is normal.      Breath sounds: Normal breath sounds.   Abdominal:      General: Abdomen is flat.      Tenderness: There is no abdominal tenderness.   Musculoskeletal: Normal range of motion.         General: No swelling.   Skin:     General: Skin is warm.   Neurological:      Mental Status: She is oriented to person, place, and time.      Motor: No weakness.      Gait: Gait normal.   Psychiatric:         Mood and Affect: Mood normal.         Thought Content: Thought content normal.       Advanced Care Plan  Does patient have advance directive?: No   Patient does not have Advance Directive: Patient/Family declines further information       Does patient have current OOH DNR form?: No   Patient does not have current OOH DNR form: Patient/Family declines further information       Does patient have current POLST?: No   Patient does not have current  POLST: Patient/Family declines further information         PHQ  Will the patient answer the depression questions?: Yes   Over the past 2 weeks, how often have you been bothered by feeling little interest or pleasure in doing things?: Not at all   Over the past 2 weeks, how often have you been bothered by feeling down, depressed, or hopeless?: Not at all   Depression Risk: 0                                             Mini Cog  Completed: Yes  Score: 1  Result: Positive      Get Up and Go  Result: Pass    STEADI Falls Risk  One or more falls in the last year: No           Has trouble stepping up onto a curb: No   Advised to use a cane or walker to get around safely: No   Often has to rush to the toilet: No   Feels unsteady when walking: No   Has lost some feeling in feet: No   Often feels sad or depressed: No   Steadies self on furniture while walking at home: No   Takes medication that makes him/her feel lightheaded or more tired than usual: No   Worried about falling: No   Takes medicine to sleep or improve mood: No   Needs to push with hands when rising from a chair: No   Falls screen completed: Yes     Hearing and Vision Screening   Hearing Screening    125Hz 250Hz 500Hz 1000Hz 2000Hz 3000Hz 4000Hz 6000Hz 8000Hz   Right ear:            Left ear:            Comments: Pt hearing grossly intact.     Vision Screening Comments: Pt will schedule an eye exam.   See HRA for relevant hearing screening response.     Assessment/Plan   Diagnoses and all orders for this visit:    Medicare annual wellness visit, initial (Primary)  -     Ambulatory referral to Neurology; Future    Type 2 diabetes mellitus without complication, without long-term current use of insulin (CMS/[facility])    Medicare annual wellness visit, initial (Primary)  Pt was well appearing and AAO x 3. Pt states she is in very good health and well taken care of by her family. She lives with her daughter, son-in-law and three grandchildren. She states they all  provide assistance when needed. She is able to perform her ADLs independently at home, but does require assistance with transportation and shopping. She admits to walking for 20 minutes daily. She admits to taking her medications daily and was advised to monitor her blood pressure at home. She will call the office with any elevated readings.     Pt agreed to obtain the Shingrix vaccine from her pharmacy 14 days after her COVID vaccine. She reports receiving the second COVID-19 vaccine on 2/23/21 at [facility]. She will also schedule follow up her dentist and ophthalmologist.  Pt's son-in-law states pt has been forgetful for some time and requested a referral for neurology. Pt was unable to complete 3 word recall during mini-cog exam.     Type 2 diabetes mellitus without complication, without long-term current use of insulin (CMS/[facility])  Recent A1c on 2/17/21 was 5.8. she agreed to follow up with ophthalmology and podiatry as advised during her last OV.          See Patient Instructions (the written plan) which was given to the patient for PPPS and health risk factors with interventions.

## 2021-03-15 RX ORDER — AMLODIPINE BESYLATE 5 MG/1
TABLET ORAL
Qty: 90 TABLET | Refills: 3 | Status: SHIPPED | OUTPATIENT
Start: 2021-03-15 | End: 2022-02-23

## 2021-03-15 NOTE — TELEPHONE ENCOUNTER
Requested Prescriptions     Pending Prescriptions Disp Refills   • amLODIPine (NORVASC) 5 mg tablet [Pharmacy Med Name: AMLODIPINE BESYLATE 5 MG TAB] 90 tablet 3     Sig: TAKE 1 TABLET BY MOUTH EVERY DAY

## 2021-03-29 DIAGNOSIS — E11.9 TYPE 2 DIABETES MELLITUS WITHOUT COMPLICATION, WITHOUT LONG-TERM CURRENT USE OF INSULIN (CMS/HCC): ICD-10-CM

## 2021-03-29 RX ORDER — METFORMIN HYDROCHLORIDE 1000 MG/1
TABLET ORAL
Qty: 135 TABLET | Refills: 2 | Status: SHIPPED | OUTPATIENT
Start: 2021-03-29 | End: 2021-12-15

## 2021-03-29 NOTE — TELEPHONE ENCOUNTER
Pharmacy requesting refill, review    Patient:   Grazyna Bejarano  Female, 80 y.o., 1940  Phone:   [phone] (H) ...  PCP:   Staci Parson MD  Last Weight:   46 kg (101 lb 8 oz)  HM Due?:   Due  Allergies:   No Known Allergies  MRN:   [id number]  Primary Ins.:   [employer]  Pharmacy:   [facility]/PHARMACY #[id number] [address] [[id number]]  Preferred Lab:   [facility]  My Pat List Reminders:   None  Rx Auth Request  Received: Today  Message Contents   Interface, Surescripts In  P [facility] Clinical Support P   Caller: Unspecified (Today,  2:28 PM)             Request received via interface.    Requested Prescriptions     Name from pharmacy: METFORMIN HCL 1,000 MG TABLET         Will file in chart as: metFORMIN (GLUCOPHAGE) 1,000 mg tablet    Sig: TAKE 1/2 TABLET IN THE MORNING AND 1 TABLET BEFORE BED    Disp:  135 tablet    Refills:  2    Start: 3/29/2021    Class: Normal    Non-formulary For: Type 2 diabetes mellitus without complication, without long-term current use of insulin (CMS/[facility])    Last ordered: 8 months ago by Kb Guevara DO Last refill: 1/8/2021    Rx #: [id number]    Antihyperglycemics Protocol [id number]/29/2021 02:28 PM   Lipid panel in past year Protocol Details    Normal HgA1C in past 6 months     Office visit in the past 6 months     No active pregnancy on record     No positive pregnancy test in the past 12 months     Normal creatinine on file in past year       To be filled at: [facility]/pharmacy #[id number] [address]         Med Refill

## 2021-05-03 ENCOUNTER — OFFICE VISIT (OUTPATIENT)
Dept: INTERNAL MEDICINE | Facility: HOSPITAL | Age: 81
End: 2021-05-03
Attending: FAMILY MEDICINE
Payer: COMMERCIAL

## 2021-05-03 ENCOUNTER — APPOINTMENT (OUTPATIENT)
Dept: LAB | Facility: HOSPITAL | Age: 81
End: 2021-05-03
Attending: STUDENT IN AN ORGANIZED HEALTH CARE EDUCATION/TRAINING PROGRAM
Payer: COMMERCIAL

## 2021-05-03 VITALS
RESPIRATION RATE: 16 BRPM | DIASTOLIC BLOOD PRESSURE: 75 MMHG | HEART RATE: 60 BPM | SYSTOLIC BLOOD PRESSURE: 182 MMHG | BODY MASS INDEX: 20.88 KG/M2 | WEIGHT: 103.6 LBS | TEMPERATURE: 97.2 F | HEIGHT: 59 IN

## 2021-05-03 DIAGNOSIS — R41.89 COGNITIVE IMPAIRMENT: Primary | ICD-10-CM

## 2021-05-03 DIAGNOSIS — I10 ESSENTIAL HYPERTENSION: ICD-10-CM

## 2021-05-03 DIAGNOSIS — R41.89 OTHER SYMPTOMS AND SIGNS INVOLVING COGNITIVE FUNCTIONS AND AWARENESS: ICD-10-CM

## 2021-05-03 PROCEDURE — 99214 OFFICE O/P EST MOD 30 MIN: CPT | Performed by: STUDENT IN AN ORGANIZED HEALTH CARE EDUCATION/TRAINING PROGRAM

## 2021-05-03 PROCEDURE — 3008F BODY MASS INDEX DOCD: CPT | Performed by: STUDENT IN AN ORGANIZED HEALTH CARE EDUCATION/TRAINING PROGRAM

## 2021-05-03 ASSESSMENT — ENCOUNTER SYMPTOMS
NEUROLOGICAL NEGATIVE: 1
SLEEP DISTURBANCE: 0
DECREASED CONCENTRATION: 0
DYSPHORIC MOOD: 0
GASTROINTESTINAL NEGATIVE: 1
NERVOUS/ANXIOUS: 0
HALLUCINATIONS: 0
CARDIOVASCULAR NEGATIVE: 1
RESPIRATORY NEGATIVE: 1
CONSTITUTIONAL NEGATIVE: 1

## 2021-05-03 ASSESSMENT — PATIENT HEALTH QUESTIONNAIRE - PHQ9: SUM OF ALL RESPONSES TO PHQ9 QUESTIONS 1 & 2: 0

## 2021-05-03 NOTE — LETTER
To Whom It May Concern,  Grazyna Bejarano suffers from mild cognitive impairment, specifically difficult recalling words and phrases she is asked to remembered and recalling words, phases and sentences. It is my professional opinion that because of this cognitive impairment she will be unable to recall phrases, sentences, facts required for her citizenship test.    If you have any questions or concerns, please do not hesitate to call our office.       Dr. Radha Aguilar

## 2021-05-03 NOTE — PROGRESS NOTES
I have discussed the patient's care with the Resident. I have   reviewed the patient's history and Resident's findings on exam, the patient's diagnosis/differential diagnosis and treatment plan. I concur with the treatment plan as documented by the Resident, except for my comments below or within additional notes today.    Mild cognitive impairment by MMSE. Agree she will be unable to remember items for citizenship exam.   Agree with additional lab w/u as ordered.    CHRISTOPH Bajwa MD

## 2021-05-03 NOTE — LETTER
To Whom It May Concern,    Grazyna Bejarano suffers from cognitive impairment which prevents her from remembering facts, phrases, and words. This is a clinical disorder and it is my professional opinion that her cognitive impairment will prevent her from being able to recall facts required for her citizenship test.     If you have any questions or concerns, please do not hesitate to call my office.    Dr Radha Aguilar  Internal Medicine

## 2021-05-03 NOTE — PROGRESS NOTES
"       [facility]  Internal Medicine Progress Note       SUBJECTIVE   Grazyna Bejarano is a 80 y.o. year-old female with a past medical history ofCAD, cataracts, DM2, HTN, osteopenia, hearing loss, vitamin D deficiency, who presents for work up of memory loss.    Last seen about 2 months prior. Mini Cog with a score of 1.  Lives with her daughter, ALAN, and 3 grandkids.     She is accompanied by her son in law who tells me that he and his wife have noticed short term memory loss that has progressively worsened over \"quite some time\". She still takes her medications on her own. She particularly has trouble remembering phrases and words.     She is preparing her for citizenship exam and her son in law worries that she will be unable to pass the test because a portion of the test requires recalling political facts.     I performed a mini mental status exam with the patient. Her score was 23.   Her PHQ was negative.          REVIEW OF SYSTEMS   Review of Systems   Constitutional: Negative.    Respiratory: Negative.    Cardiovascular: Negative.    Gastrointestinal: Negative.    Neurological: Negative.    Psychiatric/Behavioral: Negative for decreased concentration, dysphoric mood, hallucinations and sleep disturbance. The patient is not nervous/anxious.         Short term memory loss          MEDICATIONS        Current Outpatient Medications:   •  amLODIPine (NORVASC) 5 mg tablet, TAKE 1 TABLET BY MOUTH EVERY DAY, Disp: 90 tablet, Rfl: 3  •  aspirin 81 mg enteric coated tablet, Take 1 tablet (81 mg total) by mouth daily., Disp: 90 tablet, Rfl: 3  •  blood sugar diagnostic (FREESTYLE LITE STRIPS) strip, 1 each by Not Applicable route daily., Disp: 100 strip, Rfl: 3  •  cholecalciferol, vitamin D3, (VITAMIN D3) 25 mcg (1,000 unit) capsule, Take 1 capsule (1,000 Units total) by mouth daily., Disp: 90 capsule, Rfl: 3  •  metFORMIN (GLUCOPHAGE) 1,000 mg tablet, TAKE 1/2 TABLET IN THE MORNING AND 1 TABLET " "BEFORE BED, Disp: 135 tablet, Rfl: 2  •  metoprolol tartrate (LOPRESSOR) 12.5 mg, Take 1 split tablet (12.5 mg total) by mouth 2 (two) times a day., Disp: 90 tablet, Rfl: 3  •  miscellaneous medical supply (BLOOD PRESSURE CUFF) misc, 1 each by Not Applicable route daily., Disp: , Rfl:     PHYSICAL EXAMINATION   Vital signs:   Visit Vitals  BP (!) 182/75 (BP Location: Left upper arm, Patient Position: Sitting)   Pulse 60   Temp 36.2 °C (97.2 °F) (Temporal)   Resp 16   Ht 1.499 m (4' 11\")   Wt 47 kg (103 lb 9.6 oz)   BMI 20.92 kg/m²     Physical Exam  Constitutional:       General: She is not in acute distress.     Appearance: Normal appearance. She is not ill-appearing, toxic-appearing or diaphoretic.   Cardiovascular:      Rate and Rhythm: Normal rate and regular rhythm.   Pulmonary:      Effort: Pulmonary effort is normal.      Breath sounds: Normal breath sounds.   Musculoskeletal:      Right lower leg: No edema.      Left lower leg: No edema.   Neurological:      Mental Status: She is alert and oriented to person, place, and time.      Sensory: Sensation is intact.      Motor: No weakness.   Psychiatric:         Mood and Affect: Mood is not anxious, depressed or elated. Affect is not labile, blunt, flat, angry or tearful.         Behavior: Behavior is not agitated, slowed, aggressive or withdrawn. Behavior is cooperative.         Thought Content: Thought content is not paranoid.         Cognition and Memory: Cognition is impaired. She exhibits impaired recent memory.      Comments: MMSE score of 23          LABS / IMAGING/STUDIES      Labs Reviewed:   Last BMP:  Lab Results   Component Value Date     02/17/2021    K 4.9 02/17/2021     02/17/2021    CO2 23 02/17/2021    BUN 21 02/17/2021    CREATININE 0.94 02/17/2021       Last CBC:  Lab Results   Component Value Date    WBC 5.3 09/12/2019    HGB 12.0 09/12/2019    HCT 38.9 09/12/2019    MCV 91 09/12/2019     09/12/2019       Last Lipids:  Lab " Results   Component Value Date    CHOL 112 10/10/2018    HDL 58 10/10/2018    LDLCALC 46 10/10/2018    TRIG 39 10/10/2018       Last three HgbA1c:  Lab Results   Component Value Date    HGBA1C 5.8 (H) 02/17/2021    HGBA1C 6.4 (H) 09/12/2019    HGBA1C 6.1 (H) 10/10/2018       Last Microalbumin:  Lab Results   Component Value Date    MICROALBUR 4.5 02/17/2021       Last TSH:  Lab Results   Component Value Date    TSH 0.664 09/12/2019       Last Vitamin D:  Lab Results   Component Value Date    UOGH27KI 12.3 (L) 09/12/2019       No results found for: [id number]          ASSESSMENT AND PLAN      Problem List Items Addressed This Visit        Circulatory    Hypertension     Hx of white coat hypertension  Continue amlodipine             Other    Cognitive impairment - Primary     Mini Mental Status Exam 23/30. Particular difficulty with recalling 3 items after 30 seconds   Depression ruled out. Will check B12, TSH and ESR.  Provided family with written letter to bring to her citizenship exam as I feel her cognitive impairment will preclude her from being able to recall political facts on the exam.  I did discuss with son that Grazyna is at increased risk for further worsening of her cognition and to bring her back in for a repeat examination if she is no longer able to take her medications or seems to be doing worse          Relevant Orders    Vitamin B12    TSH    Sedimentation rate           HEALTHCARE MAINTENANCE   Health Maintenance Due   Topic Date Due   • Annual Dilated Retinal Exam  Never done   • Diabetic Foot Exam  Never done   • Zoster Vaccine (2 of 3) 10/22/2015   • DEXA Scan  11/06/2017          Radha Aguilar DO  05/03/21  4:35 PM

## 2021-05-03 NOTE — ASSESSMENT & PLAN NOTE
Mini Mental Status Exam 23/30. Particular difficulty with recalling 3 items after 30 seconds   Depression ruled out. Will check B12, TSH and ESR.  Provided family with written letter to bring to her citizenship exam as I feel her cognitive impairment will preclude her from being able to recall political facts on the exam.  I did discuss with son that Grazyna is at increased risk for further worsening of her cognition and to bring her back in for a repeat examination if she is no longer able to take her medications or seems to be doing worse

## 2021-05-05 LAB
ERYTHROCYTE [SEDIMENTATION RATE] IN BLOOD BY WESTERGREN METHOD: 8 MM/HR (ref 0–40)
TSH SERPL DL<=0.005 MIU/L-ACNC: 1.24 UIU/ML (ref 0.45–4.5)
VIT B12 SERPL-MCNC: 232 PG/ML (ref 232–1245)

## 2021-05-07 ENCOUNTER — TELEPHONE (OUTPATIENT)
Dept: INTERNAL MEDICINE | Facility: HOSPITAL | Age: 81
End: 2021-05-07

## 2021-05-07 DIAGNOSIS — E53.8 B12 DEFICIENCY: Primary | ICD-10-CM

## 2021-05-07 NOTE — RESULT ENCOUNTER NOTE
Borderline B12 level. Spoke with ALAN and have ordered folic acid, MMA and homocysteine to confirm B12 deficiency. If indeed deficient will prescribe B12 supplements

## 2021-05-07 NOTE — TELEPHONE ENCOUNTER
Called son-in-law Bharath to discuss borderline B12 level and need for follow up labs including MMA, homocysteine and folic acid levels. Bharath requests these lab slips be mailed to the home. I have asked our LMA pool to assist with mailing the ordered lab slips.

## 2021-06-02 ENCOUNTER — TELEPHONE (OUTPATIENT)
Dept: INTERNAL MEDICINE | Facility: HOSPITAL | Age: 81
End: 2021-06-02

## 2021-06-02 NOTE — TELEPHONE ENCOUNTER
Spoke to Bharath and verified he will come to the clinic to  the citizenship application that was filled out by Dr Parson. A copy was scanned into the patient's chart.

## 2021-09-10 DIAGNOSIS — I25.10 CORONARY ARTERY DISEASE INVOLVING NATIVE CORONARY ARTERY OF NATIVE HEART WITHOUT ANGINA PECTORIS: ICD-10-CM

## 2021-09-10 RX ORDER — ASPIRIN 81 MG/1
TABLET ORAL
Qty: 90 TABLET | Refills: 3 | Status: SHIPPED | OUTPATIENT
Start: 2021-09-10 | End: 2022-09-02 | Stop reason: SDUPTHER

## 2021-09-10 NOTE — TELEPHONE ENCOUNTER
Requested Prescriptions     Pending Prescriptions Disp Refills   • aspirin 81 mg enteric coated tablet [Pharmacy Med Name: ASPIRIN EC 81 MG TABLET] 90 tablet 3     Sig: TAKE 1 TABLET BY MOUTH EVERY DAY

## 2021-11-22 RX ORDER — VIT C/E/ZN/COPPR/LUTEIN/ZEAXAN 250MG-90MG
CAPSULE ORAL
Qty: 90 CAPSULE | Refills: 3 | Status: SHIPPED | OUTPATIENT
Start: 2021-11-22 | End: 2023-01-03 | Stop reason: SDUPTHER

## 2021-11-22 NOTE — TELEPHONE ENCOUNTER
Requested Prescriptions     Pending Prescriptions Disp Refills   • cholecalciferol, vitamin D3, 25 mcg (1,000 unit) capsule [Pharmacy Med Name: [facility] VITAMIN D3 25 MCG SOFTGEL] 90 capsule 3     Sig: TAKE 1 CAPSULE BY MOUTH EVERY DAY

## 2021-12-13 DIAGNOSIS — E11.9 TYPE 2 DIABETES MELLITUS WITHOUT COMPLICATION, WITHOUT LONG-TERM CURRENT USE OF INSULIN (CMS/HCC): ICD-10-CM

## 2021-12-15 RX ORDER — METFORMIN HYDROCHLORIDE 1000 MG/1
TABLET ORAL
Qty: 135 TABLET | Refills: 2 | Status: SHIPPED | OUTPATIENT
Start: 2021-12-15 | End: 2022-06-15

## 2021-12-15 NOTE — TELEPHONE ENCOUNTER
Requested Prescriptions     Pending Prescriptions Disp Refills   • metFORMIN 1,000 mg tablet [Pharmacy Med Name: METFORMIN HCL 1,000 MG TABLET] 135 tablet 2     Sig: TAKE 1/2 TABLET IN THE MORNING AND 1 TABLET BEFORE BED

## 2022-02-23 RX ORDER — AMLODIPINE BESYLATE 5 MG/1
TABLET ORAL
Qty: 90 TABLET | Refills: 3 | Status: SHIPPED | OUTPATIENT
Start: 2022-02-23 | End: 2022-06-08 | Stop reason: SDUPTHER

## 2022-02-25 ENCOUNTER — TELEPHONE (OUTPATIENT)
Dept: INTERNAL MEDICINE | Facility: HOSPITAL | Age: 82
End: 2022-02-25
Payer: COMMERCIAL

## 2022-02-25 NOTE — TELEPHONE ENCOUNTER
Received LMA After Hours Call regarding high blood pressure.    Patient's son in law, Bharath Vasquez, called regarding elevated BP for his mother in law. BP has been running up to the 160s-170s. Typically BP is 140-150 but has been increasing recently. Not having any symptoms of headache, blurry vision, dizziness, AMS. She takes amlodipine 5mg daily and metoprolol tartrate 12.5mg BID. Last took both medications this morning. Patient will take her evening dose of metoprolol tartrate now. Will increase amlodipine to 10mg starting tomorrow morning. Discussed when to seek urgent/emergent care. Son in law displayed understanding. Will have office call pt to schedule appt for next week for BP follow up.

## 2022-03-08 NOTE — PROGRESS NOTES
[facility]  Internal Medicine Progress Note       ASSESSMENT AND PLAN     Osteopenia of multiple sites  Last DEXA scan in 2015. Pt has been taking vitamin D supplements but not calcium. No recent falls or concern for fractures.     -Check DEXA scan  -Start taking calcium 600 mg qd  -Continue vitamine D3 1000 units qd  -Check calcium and vitamin D level    Type 2 diabetes mellitus (CMS/[facility])  A1c from 2/2021 5.8%. Currently tolerating metformin 500 mg BID. No hypoglycemic episodes.     -Continue metformin 500 mg BID  -Check a1c, microalbumin/Cr ratio  -Advised patient to schedule dilated retinal exam     CAD (coronary artery disease)  No anginal symptoms.     -Continue ASA  -Check lipid panel, consider restarting statin    Essential hypertension  Pt monitors BP at home, which has recently increased from the 150-160 to 170-180 systolic range. In office re-check 182/68. The patient is asymptomatic. Current regimen includes amlodipine 5 mg qd and metoprolol tartrate 12.5 mg BID. She was on lisinopril in the past, which was discontinued for unclear reasons.     -Start lisinopril 10 mg qd  -Continue amlodipine 5 mg qd and metoprolol tartrate 12.5 mg BID  -BP check in 2 weeks, if not adequately improve can increase amlodipine or consider changing metoprolol to carvedilol  -Check CMP in 1 week      Health care maintenance: Mammogram last 4-5 years ago, no abnormal tests  DEXA showing osteopenia in 2015,will repeat. Will address Kristie next visit.   - COVID 19: booster on 12/21/21    Return in about 2 weeks (around 3/23/2022).  At next visit: Reassess BP, offer Shingrix    Eden Dockery MD     VAUGHN Bejarano is a 81 y.o. year-old female, primary patient of Dr. Dahl with a past medical history of of CAD, cataracts, DM2, HTN, osteopenia, hearing loss, vitamin D deficiency, and mild cognitive impairment who presents for a follow up visit.     Interval History: Last LMA visit on  "5/3/21. At that time, memory loss was addressed and she was diagnosed with mild cognitive impairment with mini mental status exam score of 23. TSH and ESR at that time were wnl. B12 was low normal and folate, homocysteine, and MMA were ordered but not performed.      Today she is accompanied by her son-in-law Bharath who lives with the patient. The patient offers no complaints. Her son-in-law checks her BP at home and this has been in the 170-180 range for the past few weeks up from the 150-160 range. She continues to take amlodipine 5 qd and lopressor 12.5 mg BID. She denies headaches, increased confusion.     She continues on aspirin for hx of CAD. She denies chest pain, dyspnea, decreased exertional tolerance.     She continues metformin 500 mg BID. She is tolerating this well./ She denies episodes of hypoglycemia.     She has never had a colonsocpy but denies weight loss, changes in bowel habits, bloody or black stool.       PHYSICAL EXAMINATION     Visit Vitals  BP (!) 188/78 (BP Location: Left upper arm, Patient Position: Sitting)   Pulse 69   Temp 36.1 °C (97 °F) (Temporal)   Ht 1.499 m (4' 11\")   Wt 48.6 kg (107 lb 1.6 oz)   SpO2 99%   BMI 21.63 kg/m²       Physical Exam  Constitutional:       General: She is not in acute distress.  HENT:      Head: Normocephalic and atraumatic.      Mouth/Throat:      Mouth: Mucous membranes are moist.      Pharynx: Oropharynx is clear.   Eyes:      Extraocular Movements: Extraocular movements intact.      Conjunctiva/sclera: Conjunctivae normal.   Cardiovascular:      Rate and Rhythm: Normal rate and regular rhythm.      Heart sounds: Normal heart sounds.   Pulmonary:      Effort: Pulmonary effort is normal. No respiratory distress.      Breath sounds: Normal breath sounds.   Abdominal:      General: Abdomen is flat. Bowel sounds are normal.      Palpations: Abdomen is soft.   Musculoskeletal:      Cervical back: Neck supple.      Right lower leg: No edema.      Left lower " leg: No edema.   Skin:     General: Skin is warm and dry.   Neurological:      General: No focal deficit present.      Mental Status: She is alert and oriented to person, place, and time.             LABS / IMAGING / STUDIES        Labs Reviewed: a1c, lipid panel, BMP, CBC    Studies/Imaging Reviewed: N/a       MEDICATIONS      Current Outpatient Medications   Medication Instructions   • amLODIPine (NORVASC) 5 mg tablet TAKE 1 TABLET BY MOUTH EVERY DAY   • aspirin 81 mg enteric coated tablet TAKE 1 TABLET BY MOUTH EVERY DAY   • blood sugar diagnostic (FREESTYLE LITE STRIPS) strip 1 each, Not Applicable, Daily   • cholecalciferol, vitamin D3, 25 mcg (1,000 unit) capsule TAKE 1 CAPSULE BY MOUTH EVERY DAY   • lisinopriL (PRINIVIL) 10 mg, oral, Daily   • metFORMIN 1,000 mg tablet TAKE 1/2 TABLET IN THE MORNING AND 1 TABLET BEFORE BED   • metoprolol tartrate (LOPRESSOR) 12.5 mg, oral, 2 times daily   • miscellaneous medical supply (BLOOD PRESSURE CUFF) misc 1 each, Not Applicable, Daily

## 2022-03-09 ENCOUNTER — OFFICE VISIT (OUTPATIENT)
Dept: INTERNAL MEDICINE | Facility: HOSPITAL | Age: 82
End: 2022-03-09
Attending: STUDENT IN AN ORGANIZED HEALTH CARE EDUCATION/TRAINING PROGRAM
Payer: COMMERCIAL

## 2022-03-09 VITALS
HEART RATE: 69 BPM | TEMPERATURE: 97 F | OXYGEN SATURATION: 99 % | SYSTOLIC BLOOD PRESSURE: 188 MMHG | WEIGHT: 107.1 LBS | BODY MASS INDEX: 21.59 KG/M2 | DIASTOLIC BLOOD PRESSURE: 78 MMHG | HEIGHT: 59 IN

## 2022-03-09 DIAGNOSIS — E11.9 TYPE 2 DIABETES MELLITUS WITHOUT COMPLICATION, WITHOUT LONG-TERM CURRENT USE OF INSULIN (CMS/HCC): ICD-10-CM

## 2022-03-09 DIAGNOSIS — I25.10 CORONARY ARTERY DISEASE INVOLVING NATIVE CORONARY ARTERY OF NATIVE HEART WITHOUT ANGINA PECTORIS: ICD-10-CM

## 2022-03-09 DIAGNOSIS — I10 ESSENTIAL HYPERTENSION: Primary | ICD-10-CM

## 2022-03-09 DIAGNOSIS — M85.89 OSTEOPENIA OF MULTIPLE SITES: ICD-10-CM

## 2022-03-09 PROCEDURE — 3008F BODY MASS INDEX DOCD: CPT | Performed by: STUDENT IN AN ORGANIZED HEALTH CARE EDUCATION/TRAINING PROGRAM

## 2022-03-09 PROCEDURE — 99214 OFFICE O/P EST MOD 30 MIN: CPT | Mod: GC | Performed by: STUDENT IN AN ORGANIZED HEALTH CARE EDUCATION/TRAINING PROGRAM

## 2022-03-09 RX ORDER — LISINOPRIL 10 MG/1
10 TABLET ORAL DAILY
Qty: 90 TABLET | Refills: 1 | Status: SHIPPED | OUTPATIENT
Start: 2022-03-09 | End: 2022-06-08 | Stop reason: SDUPTHER

## 2022-03-09 ASSESSMENT — PATIENT HEALTH QUESTIONNAIRE - PHQ9: SUM OF ALL RESPONSES TO PHQ9 QUESTIONS 1 & 2: 0

## 2022-03-09 NOTE — ASSESSMENT & PLAN NOTE
A1c from 2/2021 5.8%. Currently tolerating metformin 500 mg BID. No hypoglycemic episodes.     -Continue metformin 500 mg BID  -Check a1c, microalbumin/Cr ratio  -Advised patient to schedule dilated retinal exam

## 2022-03-09 NOTE — PATIENT INSTRUCTIONS
It was a pleasure taking care of you today!     Please complete the follow:  -Start taking lisinopril 10 mg daily. Continue to monitor your blood pressure at home.   -Start taking calcium 600 mg daily in addition to the vitamin D   -Schedule your DEXA scan to check your bone density.   -Complete the blood work one week after starting the new blood pressure medicine.  -Schedule a dilated retinal exam with your eye doctor.   -Continue to take all other medications as prescribed.  -Follow-up in 2 weeks for a blood pressure check.     If you have any questions at all, run out of medications or feel you need an earlier appointment, please do not hesitate to call us.        All the best,    Eden Dockery MD  [facility]  [phone]

## 2022-03-09 NOTE — ASSESSMENT & PLAN NOTE
Last DEXA scan in 2015. Pt has been taking vitamin D supplements but not calcium. No recent falls or concern for fractures.     -Check DEXA scan  -Start taking calcium 600 mg qd  -Continue vitamine D3 1000 units qd  -Check calcium and vitamin D level

## 2022-03-09 NOTE — ASSESSMENT & PLAN NOTE
Pt monitors BP at home, which has recently increased from the 150-160 to 170-180 systolic range. In office re-check 182/68. The patient is asymptomatic. Current regimen includes amlodipine 5 mg qd and metoprolol tartrate 12.5 mg BID. She was on lisinopril in the past, which was discontinued for unclear reasons.     -Start lisinopril 10 mg qd  -Continue amlodipine 5 mg qd and metoprolol tartrate 12.5 mg BID  -BP check in 2 weeks, if not adequately improve can increase amlodipine or consider changing metoprolol to carvedilol  -Check CMP in 1 week

## 2022-04-21 DIAGNOSIS — I10 ESSENTIAL HYPERTENSION: ICD-10-CM

## 2022-04-21 NOTE — TELEPHONE ENCOUNTER
Requested Prescriptions     Pending Prescriptions Disp Refills   • metoprolol tartrate (LOPRESSOR) 25 mg tablet 90 tablet 3     Sig: Take 12.5 mg by mouth 2 (two) times a day.

## 2022-04-22 RX ORDER — METOPROLOL TARTRATE 25 MG/1
12.5 TABLET, FILM COATED ORAL 2 TIMES DAILY
Qty: 90 TABLET | Refills: 3 | Status: SHIPPED | OUTPATIENT
Start: 2022-04-22 | End: 2022-06-08 | Stop reason: SDUPTHER

## 2022-06-08 ENCOUNTER — TELEPHONE (OUTPATIENT)
Dept: INTERNAL MEDICINE | Facility: HOSPITAL | Age: 82
End: 2022-06-08
Payer: COMMERCIAL

## 2022-06-08 DIAGNOSIS — I10 ESSENTIAL HYPERTENSION: ICD-10-CM

## 2022-06-08 RX ORDER — METOPROLOL TARTRATE 25 MG/1
12.5 TABLET, FILM COATED ORAL 2 TIMES DAILY
Qty: 90 TABLET | Refills: 3 | Status: SHIPPED | OUTPATIENT
Start: 2022-06-08 | End: 2022-06-09 | Stop reason: SDUPTHER

## 2022-06-08 RX ORDER — AMLODIPINE BESYLATE 5 MG/1
5 TABLET ORAL
Qty: 90 TABLET | Refills: 3 | Status: SHIPPED | OUTPATIENT
Start: 2022-06-08 | End: 2023-02-20

## 2022-06-08 RX ORDER — LISINOPRIL 10 MG/1
10 TABLET ORAL DAILY
Qty: 90 TABLET | Refills: 1 | Status: SHIPPED | OUTPATIENT
Start: 2022-06-08 | End: 2022-06-16

## 2022-06-08 NOTE — TELEPHONE ENCOUNTER
Spoke with son-in-law, Bharath. He stated that he will set up for a MyChart Video appointment. He also stated that the patient is out of metoprolol and needs some to hold her over until the appointment.

## 2022-06-08 NOTE — TELEPHONE ENCOUNTER
Spoke Bharath, patient's son-in-law. He stated that the patient has been taking metoprolol 25mg BID instead of 12.5mg BID. He stated that her BP has been under control and is requesting that the metoprolol be changed to 25mg BID. Please send script to [facility] in [place].

## 2022-06-08 NOTE — TELEPHONE ENCOUNTER
Can you please tell him to book an appointment? It looks like we havent seen him in 3 months. And we shouldn't really be uptitrating meds over the phone. Thanks!

## 2022-06-09 ENCOUNTER — TELEPHONE (OUTPATIENT)
Dept: INTERNAL MEDICINE | Facility: HOSPITAL | Age: 82
End: 2022-06-09
Payer: COMMERCIAL

## 2022-06-09 DIAGNOSIS — I10 ESSENTIAL HYPERTENSION: ICD-10-CM

## 2022-06-09 RX ORDER — METOPROLOL TARTRATE 25 MG/1
12.5 TABLET, FILM COATED ORAL 2 TIMES DAILY
Qty: 90 TABLET | Refills: 3 | Status: SHIPPED | OUTPATIENT
Start: 2022-06-09 | End: 2022-06-15

## 2022-06-09 NOTE — TELEPHONE ENCOUNTER
Pt's son is law in concerned about his mother law being completely out of her medication., its been a day and she starting to show signs her blood pressure is elevated. Doctor recommended that BP meds should be increased but it's not.

## 2022-06-09 NOTE — TELEPHONE ENCOUNTER
Patient has been taking metoprolol 25 mg BID not 12.5 mg BID. Patient hasn't taken her medication in 2 days and has a headache that will not resolve. Patient isn't able to get her metoprolol refilled until July 7th. Patient's son-in-law, Bharath stated that he is unable to bring the patient in tomorrow for evaluation because he is out of town. Bharath would like to get a 10 day supply for the patient.

## 2022-06-09 NOTE — TELEPHONE ENCOUNTER
Hey, I refilled the metoprolol yesterday. I resent the script just in case. Can you let him know please and to call us if he still cant get meds

## 2022-06-13 NOTE — PROGRESS NOTES
[facility]  Internal Medicine Progress Note       ASSESSMENT AND PLAN     Essential hypertension  Unfortunately there was lot of confusoin as to what patient should be taking.  I recommended to restart Lisinopril 10mg daily, continue Amlodipine 5mg daily and change metoprolol tartrate to succinate 12.5mg daily. If BP continues to be high can increase metoprolol to 25mg daily.   She will get labs done today  She will fu with 2 weeks for repeat BP check  Advised to go to ED if has any concerning sx's    Vitamin D deficiency  Fu vitamin D level    Type 2 diabetes mellitus (CMS/[facility])  Fu a1c  Continue metformin 500mg BID for now     CAD (coronary artery disease)  Fu lipid panel  Consider statin based on ASCVD      Health care maintenance: deferred    Return in about 2 weeks (around 6/29/2022).  At next visit: fu BP  HCM  Discuss labs    DO VAUGHN Acosta     Grazyna Bejarano is a 81 y.o. year-old female, primary patient of Dr. Dahl with a past medical history of CAD, cataracts, DM2, HTN, osteopenia, hearing loss, vitamin D deficiency, and mild cognitive impairment presents for BP check    Last seen in A 3/2022 for HCM exam.    Presents with son in law. Ran out of medications last Wednesday but then got refill on Friday- did take medications this morning. She took Lisinopril for a few days , continues to take amlodipine 5mgd and was taking metoprolol 25mg bid instead of 12.5 mg BID. Not taking lisinopril for 2 days. In conclusion there was a lot of confusion as to what she is suppose to be taking. She did not get a chance to get labs done since last visit.    #osteopenia: DEXA scan ordered at last visit. Was told to start takign calcium and vitamin D    #T2DM: continues to be metformin 500mg BID. A1c and microalbumin ordered at prior visit.    #CAD: lipid panel ordered at last visit. Not on statin. On ASA.    #HTN; BP in office high last visit was suppose to start lisinopril 10mg  "daily, continued on amlodipine 5mg daily and metop tartrate 12.5mg BID. Cr last checked in 2021 was normal. Due for repeat labs. Denies chest pain, SOB, nausea, vomiting, lightheadedness, dizziness, blurry vision, headaches. She states she has a component of white coat HTN.    PHYSICAL EXAMINATION     Visit Vitals  BP (!) 160/80   Pulse (!) 55   Temp (!) 36 °C (96.8 °F) (Temporal)   Resp 19   Ht 1.499 m (4' 11\")   Wt 49.4 kg (109 lb)   SpO2 99%   BMI 22.02 kg/m²       Physical Exam  Constitutional: Oriented to person, place, and time. Frail and elderly. Pleasant. No distress.   Head: Normocephalic and atraumatic.   Eyes: EOM are normal. Pupils are equal, round, and reactive to light.   Neck: Normal range of motion. Neck supple. No JVD present   Cardiovascular: Normal rate, regular rhythm and normal heart sounds.  Exam reveals no gallop and no friction rub.    No murmur heard.  Pulmonary/Chest: Effort normal. No respiratory distress. CTA  b/l. No rales.   Abdominal: Soft. Bowel sounds are normal. Exhibits no distension. There is no tenderness. There is no guarding.   Musculoskeletal: Exhibits no edema.   Neurological: Alert and oriented to person, place, and time.   Skin: Skin is warm and dry. No rash noted. Not diaphoretic.   Nursing note and vitals reviewed.        LABS / IMAGING / STUDIES        Labs Reviewed    Studies/Imaging Reviewed      MEDICATIONS      Current Outpatient Medications   Medication Instructions   • amLODIPine (NORVASC) 5 mg, oral, Daily (6a)   • aspirin 81 mg enteric coated tablet TAKE 1 TABLET BY MOUTH EVERY DAY   • blood sugar diagnostic (FREESTYLE LITE STRIPS) strip 1 each, Not Applicable, Daily   • cholecalciferol, vitamin D3, 25 mcg (1,000 unit) capsule TAKE 1 CAPSULE BY MOUTH EVERY DAY   • lisinopriL (PRINIVIL) 10 mg, oral, Daily   • metFORMIN (GLUCOPHAGE) 500 mg, oral, 2 times daily with meals   • metoprolol succinate XL (TOPROL-XL) 12.5 mg, oral, Daily   • miscellaneous medical supply " (BLOOD PRESSURE CUFF) misc 1 each, Not Applicable, Daily

## 2022-06-15 ENCOUNTER — OFFICE VISIT (OUTPATIENT)
Dept: INTERNAL MEDICINE | Facility: HOSPITAL | Age: 82
End: 2022-06-15
Payer: COMMERCIAL

## 2022-06-15 ENCOUNTER — APPOINTMENT (OUTPATIENT)
Dept: LAB | Facility: HOSPITAL | Age: 82
End: 2022-06-15
Attending: STUDENT IN AN ORGANIZED HEALTH CARE EDUCATION/TRAINING PROGRAM
Payer: COMMERCIAL

## 2022-06-15 VITALS
BODY MASS INDEX: 21.97 KG/M2 | TEMPERATURE: 96.8 F | HEART RATE: 55 BPM | OXYGEN SATURATION: 99 % | RESPIRATION RATE: 19 BRPM | HEIGHT: 59 IN | DIASTOLIC BLOOD PRESSURE: 80 MMHG | SYSTOLIC BLOOD PRESSURE: 160 MMHG | WEIGHT: 109 LBS

## 2022-06-15 DIAGNOSIS — I10 ESSENTIAL (PRIMARY) HYPERTENSION: ICD-10-CM

## 2022-06-15 DIAGNOSIS — E11.9 TYPE 2 DIABETES MELLITUS WITHOUT COMPLICATIONS (CMS/HCC): ICD-10-CM

## 2022-06-15 DIAGNOSIS — E11.9 TYPE 2 DIABETES MELLITUS WITHOUT COMPLICATION, WITHOUT LONG-TERM CURRENT USE OF INSULIN (CMS/HCC): ICD-10-CM

## 2022-06-15 DIAGNOSIS — E55.9 VITAMIN D DEFICIENCY: ICD-10-CM

## 2022-06-15 DIAGNOSIS — I10 ESSENTIAL HYPERTENSION: ICD-10-CM

## 2022-06-15 DIAGNOSIS — I10 HYPERTENSION, UNSPECIFIED TYPE: ICD-10-CM

## 2022-06-15 DIAGNOSIS — I25.10 CORONARY ARTERY DISEASE INVOLVING NATIVE CORONARY ARTERY OF NATIVE HEART WITHOUT ANGINA PECTORIS: ICD-10-CM

## 2022-06-15 DIAGNOSIS — E78.5 HYPERLIPIDEMIA, UNSPECIFIED HYPERLIPIDEMIA TYPE: ICD-10-CM

## 2022-06-15 DIAGNOSIS — M85.80 OSTEOPENIA, UNSPECIFIED LOCATION: Primary | ICD-10-CM

## 2022-06-15 DIAGNOSIS — E78.5 HYPERLIPIDEMIA, UNSPECIFIED: ICD-10-CM

## 2022-06-15 DIAGNOSIS — M85.80 OTHER SPECIFIED DISORDERS OF BONE DENSITY AND STRUCTURE, UNSPECIFIED SITE: ICD-10-CM

## 2022-06-15 PROCEDURE — 3008F BODY MASS INDEX DOCD: CPT | Performed by: STUDENT IN AN ORGANIZED HEALTH CARE EDUCATION/TRAINING PROGRAM

## 2022-06-15 PROCEDURE — 99214 OFFICE O/P EST MOD 30 MIN: CPT | Mod: GC | Performed by: STUDENT IN AN ORGANIZED HEALTH CARE EDUCATION/TRAINING PROGRAM

## 2022-06-15 RX ORDER — METFORMIN HYDROCHLORIDE 500 MG/1
500 TABLET ORAL 2 TIMES DAILY WITH MEALS
Qty: 180 TABLET | Refills: 1 | Status: SHIPPED | OUTPATIENT
Start: 2022-06-15 | End: 2022-12-07

## 2022-06-15 RX ORDER — METOPROLOL SUCCINATE 25 MG/1
12.5 TABLET, EXTENDED RELEASE ORAL DAILY
Qty: 45 TABLET | Refills: 1 | Status: SHIPPED | OUTPATIENT
Start: 2022-06-15 | End: 2022-06-16

## 2022-06-15 ASSESSMENT — PATIENT HEALTH QUESTIONNAIRE - PHQ9: SUM OF ALL RESPONSES TO PHQ9 QUESTIONS 1 & 2: 0

## 2022-06-15 ASSESSMENT — PAIN SCALES - GENERAL: PAINLEVEL: 0-NO PAIN

## 2022-06-15 NOTE — PROGRESS NOTES
I discussed the management with the Resident. I reviewed the Resident's note and agree with the documented findings and plan of care, except for my comments below or within the additional notes today.    Repeat BP better at 160/80.  Agree with changes to her BP meds and rest of plan.         CHRISTOPH Bajwa MD

## 2022-06-15 NOTE — PATIENT INSTRUCTIONS
For blood pressure:  - Lisinopril 10mg once a day  - Amlodipine 5mg once a day  - Metoprolol succinate 12.5 mg (half tablet) once day    For diabetes  - metformin 500mg (one tablet) twice a day    Get labs done. Make appointment to recheck pressure in a few weeks.

## 2022-06-15 NOTE — ASSESSMENT & PLAN NOTE
Unfortunately there was lot of confusoin as to what patient should be taking.  I recommended to restart Lisinopril 10mg daily, continue Amlodipine 5mg daily and change metoprolol tartrate to succinate 12.5mg daily. If BP continues to be high can increase metoprolol to 25mg daily.   She will get labs done today  She will fu with 2 weeks for repeat BP check  Advised to go to ED if has any concerning sx's

## 2022-06-16 ENCOUNTER — DOCUMENTATION (OUTPATIENT)
Dept: INTERNAL MEDICINE | Facility: HOSPITAL | Age: 82
End: 2022-06-16
Payer: COMMERCIAL

## 2022-06-16 LAB
25(OH)D3+25(OH)D2 SERPL-MCNC: 29.9 NG/ML (ref 30–100)
ALBUMIN SERPL-MCNC: 4.6 G/DL (ref 3.7–4.7)
ALBUMIN/CREAT UR: <6 MG/G CREAT (ref 0–29)
ALBUMIN/GLOB SERPL: 1.6 {RATIO} (ref 1.2–2.2)
ALP SERPL-CCNC: 74 IU/L (ref 44–121)
ALT SERPL-CCNC: 11 IU/L (ref 0–32)
AST SERPL-CCNC: 19 IU/L (ref 0–40)
BILIRUB SERPL-MCNC: <0.2 MG/DL (ref 0–1.2)
BUN SERPL-MCNC: 23 MG/DL (ref 8–27)
BUN/CREAT SERPL: 25 (ref 12–28)
CALCIUM SERPL-MCNC: 10 MG/DL (ref 8.7–10.3)
CHLORIDE SERPL-SCNC: 106 MMOL/L (ref 96–106)
CHOLEST SERPL-MCNC: 195 MG/DL (ref 100–199)
CO2 SERPL-SCNC: 24 MMOL/L (ref 20–29)
CREAT SERPL-MCNC: 0.92 MG/DL (ref 0.57–1)
CREAT UR-MCNC: 49 MG/DL
EGFRCR SERPLBLD CKD-EPI 2021: 63 ML/MIN/1.73
ERYTHROCYTE [DISTWIDTH] IN BLOOD BY AUTOMATED COUNT: 13.6 % (ref 11.7–15.4)
GLOBULIN SER CALC-MCNC: 2.9 G/DL (ref 1.5–4.5)
GLUCOSE SERPL-MCNC: 120 MG/DL (ref 65–99)
HBA1C MFR BLD: 6.2 % (ref 4.8–5.6)
HCT VFR BLD AUTO: 36.4 % (ref 34–46.6)
HDLC SERPL-MCNC: 79 MG/DL
HGB BLD-MCNC: 11.5 G/DL (ref 11.1–15.9)
LDLC SERPL CALC-MCNC: 97 MG/DL (ref 0–99)
MCH RBC QN AUTO: 27.4 PG (ref 26.6–33)
MCHC RBC AUTO-ENTMCNC: 31.6 G/DL (ref 31.5–35.7)
MCV RBC AUTO: 87 FL (ref 79–97)
MICROALBUMIN UR-MCNC: <3 UG/ML
PLATELET # BLD AUTO: 239 X10E3/UL (ref 150–450)
POTASSIUM SERPL-SCNC: 5.6 MMOL/L (ref 3.5–5.2)
PROT SERPL-MCNC: 7.5 G/DL (ref 6–8.5)
RBC # BLD AUTO: 4.19 X10E6/UL (ref 3.77–5.28)
SODIUM SERPL-SCNC: 143 MMOL/L (ref 134–144)
TRIGL SERPL-MCNC: 109 MG/DL (ref 0–149)
VLDLC SERPL CALC-MCNC: 19 MG/DL (ref 5–40)
WBC # BLD AUTO: 5.5 X10E3/UL (ref 3.4–10.8)

## 2022-06-16 RX ORDER — METOPROLOL SUCCINATE 25 MG/1
25 TABLET, EXTENDED RELEASE ORAL DAILY
Qty: 90 TABLET | Refills: 3 | Status: SHIPPED | OUTPATIENT
Start: 2022-06-16 | End: 2023-02-20

## 2022-06-16 NOTE — PROGRESS NOTES
Discussed lab results with attending, given K is >5.5 we discussed stopping lisinopril for now. I called and spoke with patients son , I told him we will in fact STOP lisinopril for now, recheck BMP in a few weeks if K is improved we can consider restarting it. For now instead of continuing lisinopril we can increase metoprolol succinate to 25mg daily.     Olivia Amaro DO  [id number]  [id number]

## 2022-09-02 DIAGNOSIS — I25.10 CORONARY ARTERY DISEASE INVOLVING NATIVE CORONARY ARTERY OF NATIVE HEART WITHOUT ANGINA PECTORIS: ICD-10-CM

## 2022-09-02 RX ORDER — ASPIRIN 81 MG/1
81 TABLET ORAL
Qty: 90 TABLET | Refills: 3 | Status: SHIPPED | OUTPATIENT
Start: 2022-09-02 | End: 2023-03-15 | Stop reason: ALTCHOICE

## 2022-09-02 NOTE — TELEPHONE ENCOUNTER
Medicine Refill Request    Last Office: 6/15/2022   Last Consult Visit: Visit date not found  Last Telemedicine Visit: Visit date not found    Next Appointment: Visit date not found      Current Outpatient Medications:     aspirin 81 mg enteric coated tablet, TAKE 1 TABLET BY MOUTH EVERY DAY, Disp: 90 tablet, Rfl: 3    cholecalciferol, vitamin D3, 25 mcg (1,000 unit) capsule, TAKE 1 CAPSULE BY MOUTH EVERY DAY, Disp: 90 capsule, Rfl: 3    amLODIPine (NORVASC) 5 mg tablet, Take 1 tablet (5 mg total) by mouth once daily., Disp: 90 tablet, Rfl: 3    blood sugar diagnostic (FREESTYLE LITE STRIPS) strip, 1 each by Not Applicable route daily., Disp: 100 strip, Rfl: 3    metFORMIN (GLUCOPHAGE) 500 mg tablet, Take 1 tablet (500 mg total) by mouth 2 (two) times a day with meals., Disp: 180 tablet, Rfl: 1    metoprolol succinate XL (TOPROL-XL) 25 mg 24 hr tablet, Take 1 tablet (25 mg total) by mouth daily., Disp: 90 tablet, Rfl: 3    miscellaneous medical supply (BLOOD PRESSURE CUFF) misc, 1 each by Not Applicable route daily., Disp: , Rfl:       BP Readings from Last 3 Encounters:   06/15/22 (!) 160/80   03/09/22 (!) 188/78   05/03/21 (!) 182/75       Recent Lab results:  Lab Results   Component Value Date    CHOL 195 06/15/2022   ,   Lab Results   Component Value Date    HDL 79 06/15/2022   ,   Lab Results   Component Value Date    LDLCALC 97 06/15/2022   ,   Lab Results   Component Value Date    TRIG 109 06/15/2022        Lab Results   Component Value Date    GLUCOSE 120 (H) 06/15/2022   ,   Lab Results   Component Value Date    HGBA1C 6.2 (H) 06/15/2022         Lab Results   Component Value Date    CREATININE 0.92 06/15/2022       Lab Results   Component Value Date    TSH 1.240 05/03/2021

## 2022-12-06 NOTE — TELEPHONE ENCOUNTER
Medicine Refill Request    Last Office: 6/15/2022   Last Consult Visit: Visit date not found  Last Telemedicine Visit: Visit date not found    Next Appointment: Visit date not found      Current Outpatient Medications:   •  cholecalciferol, vitamin D3, 25 mcg (1,000 unit) capsule, TAKE 1 CAPSULE BY MOUTH EVERY DAY, Disp: 90 capsule, Rfl: 3  •  amLODIPine (NORVASC) 5 mg tablet, Take 1 tablet (5 mg total) by mouth once daily., Disp: 90 tablet, Rfl: 3  •  aspirin 81 mg enteric coated tablet, Take 1 tablet (81 mg total) by mouth once daily., Disp: 90 tablet, Rfl: 3  •  blood sugar diagnostic (FREESTYLE LITE STRIPS) strip, 1 each by Not Applicable route daily., Disp: 100 strip, Rfl: 3  •  metFORMIN (GLUCOPHAGE) 500 mg tablet, Take 1 tablet (500 mg total) by mouth 2 (two) times a day with meals., Disp: 180 tablet, Rfl: 1  •  metoprolol succinate XL (TOPROL-XL) 25 mg 24 hr tablet, Take 1 tablet (25 mg total) by mouth daily., Disp: 90 tablet, Rfl: 3  •  miscellaneous medical supply (BLOOD PRESSURE CUFF) misc, 1 each by Not Applicable route daily., Disp: , Rfl:       BP Readings from Last 3 Encounters:   06/15/22 (!) 160/80   03/09/22 (!) 188/78   05/03/21 (!) 182/75       Recent Lab results:  Lab Results   Component Value Date    CHOL 195 06/15/2022   ,   Lab Results   Component Value Date    HDL 79 06/15/2022   ,   Lab Results   Component Value Date    LDLCALC 97 06/15/2022   ,   Lab Results   Component Value Date    TRIG 109 06/15/2022        Lab Results   Component Value Date    GLUCOSE 120 (H) 06/15/2022   ,   Lab Results   Component Value Date    HGBA1C 6.2 (H) 06/15/2022         Lab Results   Component Value Date    CREATININE 0.92 06/15/2022       Lab Results   Component Value Date    TSH 1.240 05/03/2021

## 2022-12-07 RX ORDER — METFORMIN HYDROCHLORIDE 500 MG/1
TABLET ORAL
Qty: 180 TABLET | Refills: 1 | Status: SHIPPED | OUTPATIENT
Start: 2022-12-07 | End: 2023-05-31 | Stop reason: SDUPTHER

## 2022-12-12 DIAGNOSIS — E11.9 TYPE 2 DIABETES MELLITUS WITHOUT COMPLICATION, WITHOUT LONG-TERM CURRENT USE OF INSULIN (CMS/HCC): ICD-10-CM

## 2022-12-12 RX ORDER — BLOOD-GLUCOSE METER
1 KIT MISCELLANEOUS DAILY
Qty: 100 STRIP | Refills: 3 | Status: SHIPPED | OUTPATIENT
Start: 2022-12-12 | End: 2024-06-10

## 2022-12-12 NOTE — TELEPHONE ENCOUNTER
Requested Prescriptions     Pending Prescriptions Disp Refills   • blood sugar diagnostic (FREESTYLE LITE STRIPS) strip 100 strip 3     Si each by Not Applicable route daily.

## 2023-01-03 RX ORDER — VIT C/E/ZN/COPPR/LUTEIN/ZEAXAN 250MG-90MG
CAPSULE ORAL
Qty: 30 CAPSULE | Refills: 6 | Status: SHIPPED | OUTPATIENT
Start: 2023-01-03 | End: 2024-05-28 | Stop reason: SDUPTHER

## 2023-01-03 NOTE — TELEPHONE ENCOUNTER
Medicine Refill Request    Last Office: 6/15/2022   Last Consult Visit: Visit date not found  Last Telemedicine Visit: Visit date not found    Next Appointment: Visit date not found      Current Outpatient Medications:   •  cholecalciferol, vitamin D3, 25 mcg (1,000 unit) capsule, TAKE 1 CAPSULE BY MOUTH EVERY DAY, Disp: 90 capsule, Rfl: 3  •  amLODIPine (NORVASC) 5 mg tablet, Take 1 tablet (5 mg total) by mouth once daily., Disp: 90 tablet, Rfl: 3  •  aspirin 81 mg enteric coated tablet, Take 1 tablet (81 mg total) by mouth once daily., Disp: 90 tablet, Rfl: 3  •  blood sugar diagnostic (FREESTYLE LITE STRIPS) strip, 1 each by Not Applicable route daily., Disp: 100 strip, Rfl: 3  •  metFORMIN (GLUCOPHAGE) 500 mg tablet, TAKE 1 TABLET BY MOUTH TWICE A DAY WITH MEALS, Disp: 180 tablet, Rfl: 1  •  metoprolol succinate XL (TOPROL-XL) 25 mg 24 hr tablet, Take 1 tablet (25 mg total) by mouth daily., Disp: 90 tablet, Rfl: 3  •  miscellaneous medical supply (BLOOD PRESSURE CUFF) misc, 1 each by Not Applicable route daily., Disp: , Rfl:       BP Readings from Last 3 Encounters:   06/15/22 (!) 160/80   03/09/22 (!) 188/78   05/03/21 (!) 182/75       Recent Lab results:  Lab Results   Component Value Date    CHOL 195 06/15/2022   ,   Lab Results   Component Value Date    HDL 79 06/15/2022   ,   Lab Results   Component Value Date    LDLCALC 97 06/15/2022   ,   Lab Results   Component Value Date    TRIG 109 06/15/2022        Lab Results   Component Value Date    GLUCOSE 120 (H) 06/15/2022   ,   Lab Results   Component Value Date    HGBA1C 6.2 (H) 06/15/2022         Lab Results   Component Value Date    CREATININE 0.92 06/15/2022       Lab Results   Component Value Date    TSH 1.240 05/03/2021

## 2023-02-20 ENCOUNTER — OFFICE VISIT (OUTPATIENT)
Dept: INTERNAL MEDICINE | Facility: HOSPITAL | Age: 83
End: 2023-02-20
Attending: STUDENT IN AN ORGANIZED HEALTH CARE EDUCATION/TRAINING PROGRAM
Payer: COMMERCIAL

## 2023-02-20 VITALS
SYSTOLIC BLOOD PRESSURE: 176 MMHG | DIASTOLIC BLOOD PRESSURE: 79 MMHG | WEIGHT: 112 LBS | OXYGEN SATURATION: 100 % | TEMPERATURE: 97 F | HEART RATE: 61 BPM | BODY MASS INDEX: 22.58 KG/M2 | HEIGHT: 59 IN

## 2023-02-20 DIAGNOSIS — M85.89 OSTEOPENIA OF MULTIPLE SITES: ICD-10-CM

## 2023-02-20 DIAGNOSIS — E55.9 VITAMIN D DEFICIENCY: ICD-10-CM

## 2023-02-20 DIAGNOSIS — E11.9 TYPE 2 DIABETES MELLITUS WITHOUT COMPLICATION, WITHOUT LONG-TERM CURRENT USE OF INSULIN (CMS/HCC): ICD-10-CM

## 2023-02-20 DIAGNOSIS — Z13.820 SCREENING FOR OSTEOPOROSIS: Primary | ICD-10-CM

## 2023-02-20 DIAGNOSIS — I25.10 CORONARY ARTERY DISEASE INVOLVING NATIVE CORONARY ARTERY OF NATIVE HEART WITHOUT ANGINA PECTORIS: ICD-10-CM

## 2023-02-20 DIAGNOSIS — I10 ESSENTIAL HYPERTENSION: ICD-10-CM

## 2023-02-20 PROCEDURE — 3008F BODY MASS INDEX DOCD: CPT | Performed by: STUDENT IN AN ORGANIZED HEALTH CARE EDUCATION/TRAINING PROGRAM

## 2023-02-20 PROCEDURE — 99214 OFFICE O/P EST MOD 30 MIN: CPT | Mod: GC | Performed by: STUDENT IN AN ORGANIZED HEALTH CARE EDUCATION/TRAINING PROGRAM

## 2023-02-20 RX ORDER — AMLODIPINE BESYLATE 5 MG/1
10 TABLET ORAL
Qty: 90 TABLET | Refills: 3 | Status: SHIPPED | OUTPATIENT
Start: 2023-02-20 | End: 2023-03-15

## 2023-02-20 RX ORDER — METOPROLOL SUCCINATE 25 MG/1
50 TABLET, EXTENDED RELEASE ORAL DAILY
Qty: 90 TABLET | Refills: 3 | Status: SHIPPED | OUTPATIENT
Start: 2023-02-20 | End: 2023-02-20

## 2023-02-20 RX ORDER — METOPROLOL SUCCINATE 25 MG/1
75 TABLET, EXTENDED RELEASE ORAL DAILY
Qty: 90 TABLET | Refills: 3 | Status: SHIPPED | OUTPATIENT
Start: 2023-02-20 | End: 2023-03-15

## 2023-02-20 ASSESSMENT — PAIN SCALES - GENERAL: PAINLEVEL: 0-NO PAIN

## 2023-02-20 NOTE — PROGRESS NOTES
[facility]  Internal Medicine Progress Note       ASSESSMENT AND PLAN     CAD (coronary artery disease)  Lipid panel on 6/2022 within normal limits    -Continue aspirin 81 mg daily.    Type 2 diabetes mellitus (CMS/[facility])  Last A1c 6.2% in 06/ 2022  Diabetic foot exam today unremarkable    -Continue metformin 500 mg twice daily.      Essential hypertension  Blood pressure 176/79 in office today.  Similar blood pressure readings at home.  Home regimen: Amlodipine 5 mg daily and metoprolol succinate 25 mg daily. Previously on lisinopril which was discontinue due to hyperK    -Increase amlodipine to 10 mg daily and metoprolol to 75 mg daily.  -Follow-up in 2 weeks for repeat blood pressure check.    Vitamin D deficiency  Vitamin D 29.9 in 06/2022    -Continue vitamin D supplement.    Osteopenia of multiple sites  Last DEXA scan in 2015 with osteopenia    -Counseled extensively on calcium dietary intake.  Explained that she should be orally ingesting about 1000 to 2000 mg of calcium per day.  Printed out list of foods containing high calcium   -Repeat DEXA scan      Health care maintenance:   DEXA scan due, prescription provided.  Shingles vaccine due, will get at next visit.    Return in about 2 weeks (around 3/6/2023).  At next visit: Shingles vaccine, monitor blood pressure and uptitrate medications as needed, refer to optho for annual dilated retinal exam    Sugey Dahl MD     VAUGHN Bejarano is a 82 y.o. year-old female, primary patient of Dr. Dahl with a past medical history of CAD, cataracts, DM2, HTN, osteopenia, hearing loss, vitamin D deficiency, and mild cognitive impairment  who presents for routine follow up.    Interval History: Patient accompanied by son-in-lawBharath today at appointment.  We last saw patient back in 6/2022.  Patient reports that she has been doing well since.  Her son-in-law confirms this.  She has no acute complaints.  Her son-in-law  "does say that her blood pressures have been running high at home when they checked them.  He says her systolic blood pressure has been about 1 60-1 70.  They checked her blood pressure about once a week.  She has been taking all her medications as instructed.  She does have mild cognitive impairment and sometimes has days where she is very confused however today she is alert and oriented x3 and is answering all my questions appropriately.    PHYSICAL EXAMINATION     Visit Vitals  BP (!) 176/79 (BP Location: Left upper arm, Patient Position: Sitting)   Pulse 61   Temp 36.1 °C (97 °F) (Temporal)   Ht 1.499 m (4' 11\")   Wt 50.8 kg (112 lb)   SpO2 100%   BMI 22.62 kg/m²       Physical Exam  Constitutional:       General: She is not in acute distress.     Appearance: She is not ill-appearing, toxic-appearing or diaphoretic.   HENT:      Head: Normocephalic and atraumatic.      Nose: Nose normal.      Mouth/Throat:      Mouth: Mucous membranes are moist.   Eyes:      Pupils: Pupils are equal, round, and reactive to light.   Cardiovascular:      Rate and Rhythm: Normal rate and regular rhythm.      Pulses: Normal pulses.      Heart sounds: Normal heart sounds.   Pulmonary:      Effort: Pulmonary effort is normal.      Breath sounds: Normal breath sounds.   Abdominal:      General: Abdomen is flat. Bowel sounds are normal. There is no distension.      Palpations: Abdomen is soft.      Tenderness: There is no abdominal tenderness.   Musculoskeletal:      Cervical back: Normal range of motion.      Right lower leg: No edema.      Left lower leg: No edema.   Skin:     General: Skin is warm.      Capillary Refill: Capillary refill takes less than 2 seconds.   Neurological:      General: No focal deficit present.      Mental Status: She is alert and oriented to person, place, and time.   Psychiatric:         Mood and Affect: Mood normal.             LABS / IMAGING / STUDIES        Labs Reviewed:   Last BMP:  Lab Results "   Component Value Date     06/15/2022    K 5.6 (H) 06/15/2022     06/15/2022    CO2 24 06/15/2022    BUN 23 06/15/2022    CREATININE 0.92 06/15/2022       Last CBC:  Lab Results   Component Value Date    WBC 5.5 06/15/2022    HGB 11.5 06/15/2022    HCT 36.4 06/15/2022    MCV 87 06/15/2022     06/15/2022       Last Lipids:  Lab Results   Component Value Date    CHOL 195 06/15/2022    HDL 79 06/15/2022    LDLCALC 97 06/15/2022    TRIG 109 06/15/2022       Last three HgbA1c:  Lab Results   Component Value Date    HGBA1C 6.2 (H) 06/15/2022    HGBA1C 5.8 (H) 02/17/2021    HGBA1C 6.4 (H) 09/12/2019       Last Microalbumin:  Lab Results   Component Value Date    MICROALBUR <3.0 06/15/2022       Last TSH:  Lab Results   Component Value Date    TSH 1.240 05/03/2021       Last Vitamin D:  Lab Results   Component Value Date    WHRS01ML 29.9 (L) 06/15/2022             MEDICATIONS      Current Outpatient Medications   Medication Instructions   • amLODIPine (NORVASC) 10 mg, oral, Daily (6a)   • aspirin 81 mg, oral, Daily (6a)   • blood sugar diagnostic (FREESTYLE LITE STRIPS) strip 1 each, Not Applicable, Daily   • cholecalciferol, vitamin D3, 25 mcg (1,000 unit) capsule TAKE 1 CAPSULE BY MOUTH EVERY DAYStrength: 25 mcg (1,000 unit)   • metFORMIN (GLUCOPHAGE) 500 mg tablet TAKE 1 TABLET BY MOUTH TWICE A DAY WITH MEALS   • metoprolol succinate XL (TOPROL-XL) 75 mg, oral, Daily   • miscellaneous medical supply (BLOOD PRESSURE CUFF) misc 1 each, Not Applicable, Daily

## 2023-02-20 NOTE — PATIENT INSTRUCTIONS
Dear Grazyna,     It was a pleasure seeing you in the office today. Please get your DEXA scan done.       Please give our office a call or send me a chat in [employer] if you have any questions.     Best wishes,  Dr. Sugey Dahl

## 2023-02-21 ENCOUNTER — TELEPHONE (OUTPATIENT)
Dept: INTERNAL MEDICINE | Facility: HOSPITAL | Age: 83
End: 2023-02-21
Payer: COMMERCIAL

## 2023-02-21 NOTE — ASSESSMENT & PLAN NOTE
Last A1c 6.2% in 06/ 2022  Diabetic foot exam today unremarkable    -Continue metformin 500 mg twice daily.

## 2023-02-21 NOTE — ASSESSMENT & PLAN NOTE
Last DEXA scan in 2015 with osteopenia    -Counseled extensively on calcium dietary intake.  Explained that she should be orally ingesting about 1000 to 2000 mg of calcium per day.  Printed out list of foods containing high calcium   -Repeat DEXA scan

## 2023-02-21 NOTE — TELEPHONE ENCOUNTER
Spoke with patient's relative Bharath. He went to the pharmacy and two scripts were sent over for the metoprolol. He would like a call in regards to the scripts sent to the pharmacy. Please call [phone]

## 2023-02-21 NOTE — ASSESSMENT & PLAN NOTE
Blood pressure 176/79 in office today.  Similar blood pressure readings at home.  Home regimen: Amlodipine 5 mg daily and metoprolol succinate 25 mg daily. Previously on lisinopril which was discontinue due to hyperK    -Increase amlodipine to 10 mg daily and metoprolol to 75 mg daily.  -Follow-up in 2 weeks for repeat blood pressure check.

## 2023-03-14 NOTE — PROGRESS NOTES
[facility]  Internal Medicine Progress Note       ASSESSMENT AND PLAN     Type 2 diabetes mellitus (CMS/HCC)  Labs from 6/2022 show a1c 6.2% and microalbumin/Cr ratio wnl.   Tolerating metformin.     -Continue metformin  -Repeat labs ordered for 6/2023  -Referral for vernon     Vitamin D deficiency  Continue supplementation.     Essential hypertension  At last visit the patient was instructed to increase amlodipine to 10 mg qd and metoprolol succinate to 75 mg qd.   Per the son, the patient increased amlodipine but has continued to take metoprolol 50 mg qd.   Her BP today remains elevated in the 160s/60 and she is borderline bradycardic with HR 60. She does not feel limited by this with activity.   In the past she has been on lisinopril which was discontinued secondary to hyperkalemia.     -BMP today to check potassium, if borderline high will start patient on chorthalidone. If normal than will trial ACE-I again given her history of diabetes. Will avoid increasing metoprolol given borderline bradycardia. Will not plan to be over aggressive with BP control given her age but will target SBP of 140 for now.   -Continue amlodipine 10 mg qd and metoprolol succinate 50 mg qd     Family history of coronary artery disease  Patient has had diagnosis of CAD in her documentation, however per the son she has never had an MI, cardiac cath, or stress test. He believes this diagnosis may have been given through her records from [facility]. He notes that there is a strong family history of CAD including two brothers with Mis in their 50s. Given that she has no known CAD, can discontinue asa as primary prevention. Also has been on atorvastatin in the past which appears to have been discontinued given risk/benefit discussion regarding her age. Given her history of diabetes and strong family history, will restart atorvastatin at lower dose. Lipid panel in 6/2022 showed LDL 97 which was previously in the 50s when on  "statin therapy.       -Start atorvastatin 20 mg daily  -CMP in future with other yearly labs   -Stop aspirin       Health care maintenance: DEXA scan to be scheduled.     Return in about 6 weeks (around 4/26/2023).  At next visit: HTN, HCM     Eden Dockery MD     VAUGHN Bejarano is a 82 y.o. year-old female, primary patient of Dr. Dahl with a past medical history of CAD, cataracts, DM2, HTN, osteopenia, hearing loss, vitamin D deficiency, and mild cognitive impairment who presents for follow up.     Interval History: Last LMA visit 2/20/23. At that time, amlodipine was increased to 10 mg qd and metoprolol was increased to 75 mg qd.     Today she states she is feeling well. She has been walking regularly and increasing her exercise tolerance without exertional symptoms of chest pain or dyspnea. She denies lightheadedness, dizziness, syncope.      She has been taking metoprolol 50 mg qd and amlodipine 10 mg qd.     Per the patient's son, the patient has no known CAD or prior stress testing or cardiac catheterizations. She does have a strong family history of CAD including two brothers who passed away in their 50s from MI and two brother with known CAD who passed away in there 70s.       PHYSICAL EXAMINATION     Visit Vitals  BP (!) 168/60   Pulse 60   Temp 36.1 °C (97 °F) (Temporal)   Resp 19   Ht 1.499 m (4' 11\")   Wt 51.8 kg (114 lb 3.2 oz)   SpO2 98%   BMI 23.07 kg/m²       Physical Exam  Constitutional:       General: She is not in acute distress.  HENT:      Head: Normocephalic and atraumatic.      Mouth/Throat:      Mouth: Mucous membranes are moist.      Pharynx: Oropharynx is clear.   Eyes:      Extraocular Movements: Extraocular movements intact.      Conjunctiva/sclera: Conjunctivae normal.   Cardiovascular:      Rate and Rhythm: Normal rate and regular rhythm.      Heart sounds: Normal heart sounds.   Pulmonary:      Effort: Pulmonary effort is normal. No respiratory distress.      " Breath sounds: Normal breath sounds. No wheezing or rales.   Abdominal:      General: Abdomen is flat. Bowel sounds are normal.      Palpations: Abdomen is soft.   Musculoskeletal:      Cervical back: Neck supple.      Right lower leg: No edema.      Left lower leg: No edema.   Skin:     General: Skin is warm and dry.   Neurological:      General: No focal deficit present.      Mental Status: She is alert and oriented to person, place, and time.   Psychiatric:         Mood and Affect: Mood normal.         Behavior: Behavior normal.             LABS / IMAGING / STUDIES        Labs Reviewed:   Last BMP:  Lab Results   Component Value Date     06/15/2022    K 5.6 (H) 06/15/2022     06/15/2022    CO2 24 06/15/2022    BUN 23 06/15/2022    CREATININE 0.92 06/15/2022       Last CBC:  Lab Results   Component Value Date    WBC 5.5 06/15/2022    HGB 11.5 06/15/2022    HCT 36.4 06/15/2022    MCV 87 06/15/2022     06/15/2022       Last Lipids:  Lab Results   Component Value Date    CHOL 195 06/15/2022    HDL 79 06/15/2022    LDLCALC 97 06/15/2022    TRIG 109 06/15/2022       Last three HgbA1c:  Lab Results   Component Value Date    HGBA1C 6.2 (H) 06/15/2022    HGBA1C 5.8 (H) 02/17/2021    HGBA1C 6.4 (H) 09/12/2019       Last Microalbumin:  Lab Results   Component Value Date    MICROALBUR <3.0 06/15/2022       Last TSH:  Lab Results   Component Value Date    TSH 1.240 05/03/2021       Last Vitamin D:  Lab Results   Component Value Date    HXGA29WJ 29.9 (L) 06/15/2022       Studies/Imaging Reviewed:       MEDICATIONS      Current Outpatient Medications   Medication Instructions   • amLODIPine (NORVASC) 10 mg, oral, Daily (6a)   • atorvastatin (LIPITOR) 20 mg, oral, Daily   • blood sugar diagnostic (FREESTYLE LITE STRIPS) strip 1 each, Not Applicable, Daily   • cholecalciferol, vitamin D3, 25 mcg (1,000 unit) capsule TAKE 1 CAPSULE BY MOUTH EVERY DAYStrength: 25 mcg (1,000 unit)   • metFORMIN (GLUCOPHAGE) 500 mg  tablet TAKE 1 TABLET BY MOUTH TWICE A DAY WITH MEALS   • metoprolol succinate XL (TOPROL-XL) 50 mg, oral, Daily   • miscellaneous medical supply (BLOOD PRESSURE CUFF) misc 1 each, Not Applicable, Daily

## 2023-03-15 ENCOUNTER — APPOINTMENT (OUTPATIENT)
Dept: LAB | Facility: HOSPITAL | Age: 83
End: 2023-03-15
Attending: STUDENT IN AN ORGANIZED HEALTH CARE EDUCATION/TRAINING PROGRAM
Payer: COMMERCIAL

## 2023-03-15 ENCOUNTER — OFFICE VISIT (OUTPATIENT)
Dept: INTERNAL MEDICINE | Facility: HOSPITAL | Age: 83
End: 2023-03-15
Payer: COMMERCIAL

## 2023-03-15 VITALS
HEIGHT: 59 IN | TEMPERATURE: 97 F | WEIGHT: 114.2 LBS | RESPIRATION RATE: 19 BRPM | OXYGEN SATURATION: 98 % | DIASTOLIC BLOOD PRESSURE: 60 MMHG | HEART RATE: 60 BPM | SYSTOLIC BLOOD PRESSURE: 168 MMHG | BODY MASS INDEX: 23.02 KG/M2

## 2023-03-15 DIAGNOSIS — E11.9 TYPE 2 DIABETES MELLITUS WITHOUT COMPLICATIONS (CMS/HCC): ICD-10-CM

## 2023-03-15 DIAGNOSIS — I10 ESSENTIAL HYPERTENSION: Primary | ICD-10-CM

## 2023-03-15 DIAGNOSIS — I10 ESSENTIAL (PRIMARY) HYPERTENSION: ICD-10-CM

## 2023-03-15 DIAGNOSIS — E11.9 TYPE 2 DIABETES MELLITUS WITHOUT COMPLICATION, WITHOUT LONG-TERM CURRENT USE OF INSULIN (CMS/HCC): ICD-10-CM

## 2023-03-15 DIAGNOSIS — E55.9 VITAMIN D DEFICIENCY: ICD-10-CM

## 2023-03-15 DIAGNOSIS — I25.10 CORONARY ARTERY DISEASE INVOLVING NATIVE CORONARY ARTERY OF NATIVE HEART WITHOUT ANGINA PECTORIS: ICD-10-CM

## 2023-03-15 DIAGNOSIS — Z82.49 FAMILY HISTORY OF CORONARY ARTERY DISEASE: ICD-10-CM

## 2023-03-15 PROBLEM — R05.9 COUGH: Status: RESOLVED | Noted: 2019-12-17 | Resolved: 2023-03-15

## 2023-03-15 PROBLEM — Z00.00 HEALTHCARE MAINTENANCE: Status: RESOLVED | Noted: 2019-09-12 | Resolved: 2023-03-15

## 2023-03-15 PROCEDURE — 3078F DIAST BP <80 MM HG: CPT | Performed by: STUDENT IN AN ORGANIZED HEALTH CARE EDUCATION/TRAINING PROGRAM

## 2023-03-15 PROCEDURE — 3008F BODY MASS INDEX DOCD: CPT | Performed by: STUDENT IN AN ORGANIZED HEALTH CARE EDUCATION/TRAINING PROGRAM

## 2023-03-15 PROCEDURE — 99214 OFFICE O/P EST MOD 30 MIN: CPT | Mod: GC | Performed by: STUDENT IN AN ORGANIZED HEALTH CARE EDUCATION/TRAINING PROGRAM

## 2023-03-15 PROCEDURE — 3077F SYST BP >= 140 MM HG: CPT | Performed by: STUDENT IN AN ORGANIZED HEALTH CARE EDUCATION/TRAINING PROGRAM

## 2023-03-15 RX ORDER — AMLODIPINE BESYLATE 10 MG/1
10 TABLET ORAL
Qty: 90 TABLET | Refills: 3 | Status: SHIPPED | OUTPATIENT
Start: 2023-03-15 | End: 2023-09-19 | Stop reason: SDUPTHER

## 2023-03-15 RX ORDER — METOPROLOL SUCCINATE 50 MG/1
50 TABLET, EXTENDED RELEASE ORAL DAILY
Qty: 90 TABLET | Refills: 3 | Status: SHIPPED | OUTPATIENT
Start: 2023-03-15 | End: 2023-05-18 | Stop reason: SDUPTHER

## 2023-03-15 RX ORDER — ATORVASTATIN CALCIUM 20 MG/1
20 TABLET, FILM COATED ORAL DAILY
Qty: 90 TABLET | Refills: 3 | Status: SHIPPED | OUTPATIENT
Start: 2023-03-15 | End: 2024-04-02 | Stop reason: SDUPTHER

## 2023-03-15 ASSESSMENT — PAIN SCALES - GENERAL: PAINLEVEL: 0-NO PAIN

## 2023-03-15 NOTE — ASSESSMENT & PLAN NOTE
Labs from 6/2022 show a1c 6.2% and microalbumin/Cr ratio wnl.   Tolerating metformin.     -Continue metformin  -Repeat labs ordered for 6/2023  -Referral for vernon

## 2023-03-15 NOTE — ASSESSMENT & PLAN NOTE
At last visit the patient was instructed to increase amlodipine to 10 mg qd and metoprolol succinate to 75 mg qd.   Per the son, the patient increased amlodipine but has continued to take metoprolol 50 mg qd.   Her BP today remains elevated in the 160s/60 and she is borderline bradycardic with HR 60. She does not feel limited by this with activity.   In the past she has been on lisinopril which was discontinued secondary to hyperkalemia.     -BMP today to check potassium, if borderline high will start patient on chorthalidone. If normal than will trial ACE-I again given her history of diabetes. Will avoid increasing metoprolol given borderline bradycardia. Will not plan to be over aggressive with BP control given her age but will target SBP of 140 for now.   -Continue amlodipine 10 mg qd and metoprolol succinate 50 mg qd

## 2023-03-15 NOTE — PATIENT INSTRUCTIONS
It was a pleasure taking care of you today!     Please complete the follow:  -Get lab work done today (BMP only) based on these results we will decide which blood pressure medicine to add to your regimen. We will call with the results to discuss.   -Additional Lab work ordered for June  -Continue taking amlodipine 10 mg daily  -Continue taking metoprolol succinate 50 mg daily  -Start taking atorvastatin 20 mg daily   -Stop taking aspirin   -Continue to take all other medications as prescribed.  -Schedule DEXA scan ordered last visit.   -Follow-up in 6 weeks.     If you have any questions at all, run out of medications or feel you need an earlier appointment, please do not hesitate to call us.        All the best,    Eden Dockery MD  [facility]  [phone]

## 2023-03-15 NOTE — PROGRESS NOTES
I performed a history and physical examination of the patient and discussed the management with the Resident. I reviewed the Resident's note and agree with the documented findings and plan of care, except for my comments below or within the additional notes today.      BP continues to be elevated though with high pulse pressure likely 2/2 age and arteriosclerosis, do believe she would benefit from a modest systolic goal of <150, discussed with son, agree with labs today to assess K and renal function and add 2nd agent likely ARB or Thiazide. BB maxed due to BP at 50 and Amlodipine max at 10mg. Add back atorvastatin at 20 mg, stop ASA given no history of ASCVD event and primary prevention not indicated.

## 2023-03-15 NOTE — ASSESSMENT & PLAN NOTE
Patient has had diagnosis of CAD in her documentation, however per the son she has never had an MI, cardiac cath, or stress test. He believes this diagnosis may have been given through her records from [place]. He notes that there is a strong family history of CAD including two brothers with Mis in their 50s. Given that she has no known CAD, can discontinue asa as primary prevention. Also has been on atorvastatin in the past which appears to have been discontinued given risk/benefit discussion regarding her age. Given her history of diabetes and strong family history, will restart atorvastatin at lower dose. Lipid panel in 6/2022 showed LDL 97 which was previously in the 50s when on statin therapy.       -Start atorvastatin 20 mg daily  -CMP in future with other yearly labs   -Stop aspirin

## 2023-03-16 LAB
BUN SERPL-MCNC: 20 MG/DL (ref 8–27)
BUN/CREAT SERPL: 26 (ref 12–28)
CALCIUM SERPL-MCNC: 10.2 MG/DL (ref 8.7–10.3)
CHLORIDE SERPL-SCNC: 104 MMOL/L (ref 96–106)
CO2 SERPL-SCNC: 25 MMOL/L (ref 20–29)
CREAT SERPL-MCNC: 0.77 MG/DL (ref 0.57–1)
EGFRCR SERPLBLD CKD-EPI 2021: 78 ML/MIN/1.73
GLUCOSE SERPL-MCNC: 148 MG/DL (ref 70–99)
POTASSIUM SERPL-SCNC: 5 MMOL/L (ref 3.5–5.2)
SODIUM SERPL-SCNC: 143 MMOL/L (ref 134–144)

## 2023-03-17 ENCOUNTER — TELEPHONE (OUTPATIENT)
Dept: INTERNAL MEDICINE | Facility: HOSPITAL | Age: 83
End: 2023-03-17
Payer: COMMERCIAL

## 2023-03-17 DIAGNOSIS — I10 ESSENTIAL HYPERTENSION: Primary | ICD-10-CM

## 2023-03-17 DIAGNOSIS — I25.10 CORONARY ARTERY DISEASE INVOLVING NATIVE CORONARY ARTERY OF NATIVE HEART WITHOUT ANGINA PECTORIS: ICD-10-CM

## 2023-03-17 RX ORDER — CHLORTHALIDONE 25 MG/1
12.5 TABLET ORAL DAILY
Qty: 45 TABLET | Refills: 1 | Status: CANCELLED | OUTPATIENT
Start: 2023-03-17 | End: 2023-09-13

## 2023-03-17 RX ORDER — ASPIRIN 81 MG/1
81 TABLET ORAL DAILY
Qty: 90 TABLET | Refills: 1
Start: 2023-03-17 | End: 2023-06-14 | Stop reason: ALTCHOICE

## 2023-03-17 RX ORDER — HYDROCHLOROTHIAZIDE 12.5 MG/1
12.5 TABLET ORAL DAILY
Qty: 90 TABLET | Refills: 1 | Status: SHIPPED | OUTPATIENT
Start: 2023-03-17 | End: 2023-09-19 | Stop reason: SDUPTHER

## 2023-03-17 NOTE — TELEPHONE ENCOUNTER
I called the patient and her son-in-law Bharath to discuss her lab results. We discussed that her potassium is on the high range of normal and we will avoid restarting lisinopril. We will start HCTZ 12.5 mg qd and recheck a BMP in 2 weeks. He also informed me that he re-reviewed her records from [place] and states she did have prior angioplasty done but no stent placement. Given this new information we will restart ASA 81 mg daily and this is now secondary prevention. She should restart atorvastatin as discussed in the office. He expressed understanding and was agreeable with this plan.     Eden Dockery MD  PGY-2

## 2023-05-18 DIAGNOSIS — I10 ESSENTIAL HYPERTENSION: ICD-10-CM

## 2023-05-18 NOTE — TELEPHONE ENCOUNTER
Medicine Refill Request    Last Office: 3/15/2023   Last Consult Visit: Visit date not found  Last Telemedicine Visit: Visit date not found    Next Appointment: Visit date not found      Current Outpatient Medications:   •  amLODIPine (NORVASC) 10 mg tablet, Take 1 tablet (10 mg total) by mouth once daily., Disp: 90 tablet, Rfl: 3  •  aspirin 81 mg enteric coated tablet, Take 1 tablet (81 mg total) by mouth daily., Disp: 90 tablet, Rfl: 1  •  atorvastatin (LIPITOR) 20 mg tablet, Take 1 tablet (20 mg total) by mouth daily., Disp: 90 tablet, Rfl: 3  •  blood sugar diagnostic (FREESTYLE LITE STRIPS) strip, 1 each by Not Applicable route daily., Disp: 100 strip, Rfl: 3  •  cholecalciferol, vitamin D3, 25 mcg (1,000 unit) capsule, TAKE 1 CAPSULE BY MOUTH EVERY DAY Strength: 25 mcg (1,000 unit), Disp: 30 capsule, Rfl: 6  •  hydrochlorothiazide (HYDRODIURIL) 12.5 mg tablet, Take 1 tablet (12.5 mg total) by mouth daily., Disp: 90 tablet, Rfl: 1  •  metFORMIN (GLUCOPHAGE) 500 mg tablet, TAKE 1 TABLET BY MOUTH TWICE A DAY WITH MEALS, Disp: 180 tablet, Rfl: 1  •  metoprolol succinate XL (TOPROL-XL) 50 mg 24 hr tablet, Take 1 tablet (50 mg total) by mouth daily., Disp: 90 tablet, Rfl: 3  •  miscellaneous medical supply (BLOOD PRESSURE CUFF) misc, 1 each by Not Applicable route daily., Disp: , Rfl:       BP Readings from Last 3 Encounters:   03/15/23 (!) 168/60   02/20/23 (!) 176/79   06/15/22 (!) 160/80       Recent Lab results:  Lab Results   Component Value Date    CHOL 195 06/15/2022   ,   Lab Results   Component Value Date    HDL 79 06/15/2022   ,   Lab Results   Component Value Date    LDLCALC 97 06/15/2022   ,   Lab Results   Component Value Date    TRIG 109 06/15/2022        Lab Results   Component Value Date    GLUCOSE 148 (H) 03/15/2023   ,   Lab Results   Component Value Date    HGBA1C 6.2 (H) 06/15/2022         Lab Results   Component Value Date    CREATININE 0.77 03/15/2023       Lab Results   Component Value Date     TSH 1.240 05/03/2021

## 2023-05-19 RX ORDER — METOPROLOL SUCCINATE 50 MG/1
50 TABLET, EXTENDED RELEASE ORAL DAILY
Qty: 90 TABLET | Refills: 3 | Status: SHIPPED | OUTPATIENT
Start: 2023-05-19 | End: 2024-05-28 | Stop reason: SDUPTHER

## 2023-05-31 NOTE — TELEPHONE ENCOUNTER
Medicine Refill Request    Last Office: 3/15/2023   Last Consult Visit: Visit date not found  Last Telemedicine Visit: Visit date not found    Next Appointment: 6/14/2023      Current Outpatient Medications:   •  amLODIPine (NORVASC) 10 mg tablet, Take 1 tablet (10 mg total) by mouth once daily., Disp: 90 tablet, Rfl: 3  •  aspirin 81 mg enteric coated tablet, Take 1 tablet (81 mg total) by mouth daily., Disp: 90 tablet, Rfl: 1  •  atorvastatin (LIPITOR) 20 mg tablet, Take 1 tablet (20 mg total) by mouth daily., Disp: 90 tablet, Rfl: 3  •  blood sugar diagnostic (FREESTYLE LITE STRIPS) strip, 1 each by Not Applicable route daily., Disp: 100 strip, Rfl: 3  •  cholecalciferol, vitamin D3, 25 mcg (1,000 unit) capsule, TAKE 1 CAPSULE BY MOUTH EVERY DAY Strength: 25 mcg (1,000 unit), Disp: 30 capsule, Rfl: 6  •  hydrochlorothiazide (HYDRODIURIL) 12.5 mg tablet, Take 1 tablet (12.5 mg total) by mouth daily., Disp: 90 tablet, Rfl: 1  •  metFORMIN (GLUCOPHAGE) 500 mg tablet, TAKE 1 TABLET BY MOUTH TWICE A DAY WITH MEALS, Disp: 180 tablet, Rfl: 1  •  metoprolol succinate XL (TOPROL-XL) 50 mg 24 hr tablet, Take 1 tablet (50 mg total) by mouth daily., Disp: 90 tablet, Rfl: 3  •  miscellaneous medical supply (BLOOD PRESSURE CUFF) misc, 1 each by Not Applicable route daily., Disp: , Rfl:       BP Readings from Last 3 Encounters:   03/15/23 (!) 168/60   02/20/23 (!) 176/79   06/15/22 (!) 160/80       Recent Lab results:  Lab Results   Component Value Date    CHOL 195 06/15/2022   ,   Lab Results   Component Value Date    HDL 79 06/15/2022   ,   Lab Results   Component Value Date    LDLCALC 97 06/15/2022   ,   Lab Results   Component Value Date    TRIG 109 06/15/2022        Lab Results   Component Value Date    GLUCOSE 148 (H) 03/15/2023   ,   Lab Results   Component Value Date    HGBA1C 6.2 (H) 06/15/2022         Lab Results   Component Value Date    CREATININE 0.77 03/15/2023       Lab Results   Component Value Date    TSH 1.240  05/03/2021

## 2023-06-01 RX ORDER — METFORMIN HYDROCHLORIDE 500 MG/1
500 TABLET ORAL 2 TIMES DAILY WITH MEALS
Qty: 180 TABLET | Refills: 1 | Status: SHIPPED | OUTPATIENT
Start: 2023-06-01 | End: 2023-06-26

## 2023-06-14 ENCOUNTER — OFFICE VISIT (OUTPATIENT)
Dept: INTERNAL MEDICINE | Facility: HOSPITAL | Age: 83
End: 2023-06-14
Payer: COMMERCIAL

## 2023-06-14 ENCOUNTER — APPOINTMENT (OUTPATIENT)
Dept: LAB | Facility: HOSPITAL | Age: 83
End: 2023-06-14
Attending: STUDENT IN AN ORGANIZED HEALTH CARE EDUCATION/TRAINING PROGRAM
Payer: COMMERCIAL

## 2023-06-14 VITALS
OXYGEN SATURATION: 98 % | RESPIRATION RATE: 19 BRPM | BODY MASS INDEX: 22.52 KG/M2 | DIASTOLIC BLOOD PRESSURE: 60 MMHG | TEMPERATURE: 97 F | SYSTOLIC BLOOD PRESSURE: 135 MMHG | HEART RATE: 59 BPM | WEIGHT: 111.5 LBS

## 2023-06-14 DIAGNOSIS — E11.9 TYPE 2 DIABETES MELLITUS WITHOUT COMPLICATIONS (CMS/HCC): ICD-10-CM

## 2023-06-14 DIAGNOSIS — M85.89 OSTEOPENIA OF MULTIPLE SITES: ICD-10-CM

## 2023-06-14 DIAGNOSIS — E11.9 TYPE 2 DIABETES MELLITUS WITHOUT COMPLICATION, WITHOUT LONG-TERM CURRENT USE OF INSULIN (CMS/HCC): Primary | ICD-10-CM

## 2023-06-14 DIAGNOSIS — Z13.820 SCREENING FOR OSTEOPOROSIS: ICD-10-CM

## 2023-06-14 DIAGNOSIS — I10 ESSENTIAL HYPERTENSION: ICD-10-CM

## 2023-06-14 DIAGNOSIS — I10 ESSENTIAL (PRIMARY) HYPERTENSION: ICD-10-CM

## 2023-06-14 PROCEDURE — 3075F SYST BP GE 130 - 139MM HG: CPT | Performed by: STUDENT IN AN ORGANIZED HEALTH CARE EDUCATION/TRAINING PROGRAM

## 2023-06-14 PROCEDURE — 3078F DIAST BP <80 MM HG: CPT | Performed by: STUDENT IN AN ORGANIZED HEALTH CARE EDUCATION/TRAINING PROGRAM

## 2023-06-14 PROCEDURE — 99213 OFFICE O/P EST LOW 20 MIN: CPT | Mod: GE,GC | Performed by: STUDENT IN AN ORGANIZED HEALTH CARE EDUCATION/TRAINING PROGRAM

## 2023-06-14 PROCEDURE — 3008F BODY MASS INDEX DOCD: CPT | Performed by: STUDENT IN AN ORGANIZED HEALTH CARE EDUCATION/TRAINING PROGRAM

## 2023-06-14 ASSESSMENT — PAIN SCALES - GENERAL: PAINLEVEL: 0-NO PAIN

## 2023-06-14 NOTE — ASSESSMENT & PLAN NOTE
Last DEXA scan in 2015 with osteopenia    -Counseled extensively on calcium dietary intake.  Explained that should be orally ingesting about 1200 mg of calcium per day.  -Continue vitamin D supplement  -Follow-up repeat DEXA scan.

## 2023-06-14 NOTE — PROGRESS NOTES
[facility]  Internal Medicine Progress Note       ASSESSMENT AND PLAN     Essential hypertension  /60  Patient reports adherence to medication    -Continue current regimen : Amlodipine 10 mg daily and metoprolol 50 mg daily.      Type 2 diabetes mellitus (CMS/[facility])  Previous A1c on 6/2022 6.2%  On metformin 500 mg twice daily    -Continue metformin  - follow-up repeat A1c and microalbumin to creatinine ratio  -Follow-up BMP  -Referral provided to Optho    Osteopenia of multiple sites  Last DEXA scan in 2015 with osteopenia    -Counseled extensively on calcium dietary intake.  Explained that should be orally ingesting about 1200 mg of calcium per day.  -Continue vitamin D supplement  -Follow-up repeat DEXA scan.      Health care maintenance:   Due for DEXA scan, provided with referral  Due for shingles vaccine however brother-in-law states she may have received this at the pharmacy.  He will double check records and will let us know.      Return in about 4 months (around 10/14/2023), or Please book follow up with Dr Dahl.      Sugey Dahl MD     VAUGHN Bejarano is a 82 y.o. year-old female, primary patient of Dr. Dahl with a past medical history of cataracts, DM2, HTN, osteopenia, hearing loss, vitamin D deficiency, and mild cognitive impairment who presents for follow up.     Interval History: Patient accompanied by brother-in-law Bharath today.  Patient reports she has been doing well at home and has no acute complaints.  She has been taking all her medications.  Bharath notes that patient occasionally is having problem with her attention span but they try to do a lot of puzzles in order to stimulate her brain activity.    PHYSICAL EXAMINATION     Visit Vitals  /60 (BP Location: Right upper arm, Patient Position: Sitting)   Pulse (!) 59   Temp 36.1 °C (97 °F) (Temporal)   Resp 19   Wt 50.6 kg (111 lb 8 oz)   SpO2 98%   BMI 22.52 kg/m²       Physical  Exam  Constitutional:       General: She is not in acute distress.     Appearance: She is not ill-appearing, toxic-appearing or diaphoretic.   HENT:      Head: Normocephalic and atraumatic.      Nose: Nose normal.      Mouth/Throat:      Mouth: Mucous membranes are moist.   Eyes:      Pupils: Pupils are equal, round, and reactive to light.   Cardiovascular:      Rate and Rhythm: Normal rate and regular rhythm.      Pulses: Normal pulses.      Heart sounds: Normal heart sounds.   Pulmonary:      Effort: Pulmonary effort is normal.      Breath sounds: Normal breath sounds.   Abdominal:      General: Abdomen is flat. Bowel sounds are normal. There is no distension.      Palpations: Abdomen is soft.      Tenderness: There is no abdominal tenderness.   Musculoskeletal:      Cervical back: Normal range of motion.      Right lower leg: No edema.      Left lower leg: No edema.   Skin:     General: Skin is warm.      Capillary Refill: Capillary refill takes less than 2 seconds.   Neurological:      General: No focal deficit present.      Mental Status: She is alert and oriented to person, place, and time.   Psychiatric:         Mood and Affect: Mood normal.             LABS / IMAGING / STUDIES        Labs Reviewed:  Last BMP:  Lab Results   Component Value Date     03/15/2023    K 5.0 03/15/2023     03/15/2023    CO2 25 03/15/2023    BUN 20 03/15/2023    CREATININE 0.77 03/15/2023       Last CBC:  Lab Results   Component Value Date    WBC 5.5 06/15/2022    HGB 11.5 06/15/2022    HCT 36.4 06/15/2022    MCV 87 06/15/2022     06/15/2022       Last Lipids:  Lab Results   Component Value Date    CHOL 195 06/15/2022    HDL 79 06/15/2022    LDLCALC 97 06/15/2022    TRIG 109 06/15/2022       Last three HgbA1c:  Lab Results   Component Value Date    HGBA1C 6.2 (H) 06/15/2022    HGBA1C 5.8 (H) 02/17/2021    HGBA1C 6.4 (H) 09/12/2019       Last Microalbumin:  Lab Results   Component Value Date    MICROALBUR <3.0  06/15/2022       Last TSH:  Lab Results   Component Value Date    TSH 1.240 05/03/2021       Last Vitamin D:  Lab Results   Component Value Date    JNNB79SU 29.9 (L) 06/15/2022           MEDICATIONS      Current Outpatient Medications   Medication Instructions   • amLODIPine (NORVASC) 10 mg, oral, Daily (8a)   • atorvastatin (LIPITOR) 20 mg, oral, Daily   • blood sugar diagnostic (FREESTYLE LITE STRIPS) strip 1 each, Not Applicable, Daily   • cholecalciferol, vitamin D3, 25 mcg (1,000 unit) capsule TAKE 1 CAPSULE BY MOUTH EVERY DAYStrength: 25 mcg (1,000 unit)   • hydrochlorothiazide (HYDRODIURIL) 12.5 mg, oral, Daily   • metFORMIN (GLUCOPHAGE) 500 mg, oral, 2 times daily with meals   • metoprolol succinate XL (TOPROL-XL) 50 mg, oral, Daily   • miscellaneous medical supply (BLOOD PRESSURE CUFF) misc 1 each, Not Applicable, Daily

## 2023-06-14 NOTE — ASSESSMENT & PLAN NOTE
/60  Patient reports adherence to medication    -Continue current regimen : Amlodipine 10 mg daily and metoprolol 50 mg daily.

## 2023-06-14 NOTE — ASSESSMENT & PLAN NOTE
Previous A1c on 6/2022 6.2%  On metformin 500 mg twice daily    -Continue metformin  - follow-up repeat A1c and microalbumin to creatinine ratio  -Follow-up BMP  -Referral provided to [facility]

## 2023-06-14 NOTE — PROGRESS NOTES
I discussed the management with the Resident. I reviewed the Resident's note and agree with the documented findings and plan of care, except for my comments below or within the additional notes today.    Agree with plan. Needs to get the labs ordered previously.        CHRISTOPH Bajwa MD

## 2023-06-14 NOTE — PATIENT INSTRUCTIONS
Dear Grazyna,     It was a pleasure seeing you in the office today.     Please get your labs done.     Please give our office a call or send me a chat in [employer] if you have any questions.     Best wishes,  Dr. Sugey Dahl

## 2023-06-15 LAB
ALBUMIN SERPL-MCNC: 4.8 G/DL (ref 3.7–4.7)
ALBUMIN/GLOB SERPL: 1.6 {RATIO} (ref 1.2–2.2)
ALP SERPL-CCNC: 69 IU/L (ref 44–121)
ALT SERPL-CCNC: 19 IU/L (ref 0–32)
AST SERPL-CCNC: 28 IU/L (ref 0–40)
BILIRUB SERPL-MCNC: 0.2 MG/DL (ref 0–1.2)
BUN SERPL-MCNC: 27 MG/DL (ref 8–27)
BUN/CREAT SERPL: 26 (ref 12–28)
CALCIUM SERPL-MCNC: 10.4 MG/DL (ref 8.7–10.3)
CHLORIDE SERPL-SCNC: 103 MMOL/L (ref 96–106)
CHOLEST SERPL-MCNC: 146 MG/DL (ref 100–199)
CO2 SERPL-SCNC: 28 MMOL/L (ref 20–29)
CREAT SERPL-MCNC: 1.04 MG/DL (ref 0.57–1)
EGFRCR SERPLBLD CKD-EPI 2021: 54 ML/MIN/1.73
GLOBULIN SER CALC-MCNC: 3 G/DL (ref 1.5–4.5)
GLUCOSE SERPL-MCNC: 168 MG/DL (ref 70–99)
HBA1C MFR BLD: 7.8 % (ref 4.8–5.6)
HDLC SERPL-MCNC: 82 MG/DL
LDLC SERPL CALC-MCNC: 47 MG/DL (ref 0–99)
POTASSIUM SERPL-SCNC: 4.6 MMOL/L (ref 3.5–5.2)
PROT SERPL-MCNC: 7.8 G/DL (ref 6–8.5)
SODIUM SERPL-SCNC: 144 MMOL/L (ref 134–144)
TRIGL SERPL-MCNC: 96 MG/DL (ref 0–149)
VLDLC SERPL CALC-MCNC: 17 MG/DL (ref 5–40)

## 2023-06-16 DIAGNOSIS — E11.9 TYPE 2 DIABETES MELLITUS WITHOUT COMPLICATION, WITHOUT LONG-TERM CURRENT USE OF INSULIN (CMS/HCC): Primary | ICD-10-CM

## 2023-06-16 NOTE — PROGRESS NOTES
Attempted to call patient to update on blood test results. No response.     A1c increased to 7.8% on most recent labs. Plan for worsening diabetes:   Increase metformin to 1 G BID + Add on jardiance 10 mg daily  - Advise patient to monitor blood sugars at home.  - Refer to [facility].   -  on lifestyle changes.  - Follow up in 2 months    Cr increase to 1.07 from 0.77. Plan for worsening kidney function:   - F/u UA and urine micro albumin to cr ratio  - If proteinuria, will consider switching HCTZ to ACE/ARB. However, patient will need close monitoring of K since she  has had hyperK in the past 2/2 to lisinopril.    Will attempt to call patient again to relay results next week.

## 2023-06-17 LAB
ALBUMIN/CREAT UR: 15 MG/G CREAT (ref 0–29)
CREAT UR-MCNC: 206.2 MG/DL
MICROALBUMIN UR-MCNC: 30.7 UG/ML

## 2023-06-19 NOTE — PROGRESS NOTES
Re-attempted to call patient to relay Dr Dahl's plan but there was no response. Left VM.    Isidra Casey MD  PGY2 IM

## 2023-06-22 ENCOUNTER — TELEPHONE (OUTPATIENT)
Dept: INTERNAL MEDICINE | Facility: HOSPITAL | Age: 83
End: 2023-06-22
Payer: COMMERCIAL

## 2023-06-22 NOTE — TELEPHONE ENCOUNTER
Bharath called to speak to the doctor about the patient's meds    Asking for a call back    Phn# confirmed

## 2023-06-26 RX ORDER — METFORMIN HYDROCHLORIDE 500 MG/1
1000 TABLET ORAL 2 TIMES DAILY WITH MEALS
Qty: 180 TABLET | Refills: 1 | Status: SHIPPED | OUTPATIENT
Start: 2023-06-26 | End: 2023-09-19 | Stop reason: SDUPTHER

## 2023-06-26 NOTE — TELEPHONE ENCOUNTER
Called and spoke to Bharath. Both myself and Dr Dahl tried repeatedly to reach patient last week but there was no response (see note within order). Bharath reports receiving none of these calls nor my voicemail. He is concerned about the jardiance being prescribed - I explained Dr Dahl reasoning for this but he requested this be discontinued. He was agreeable to increasing metformin to 1g BID. We agreed they can discuss jardiance with Dr Dahl at the next appointment.     Shortly after hanging up I noted that patient does not have a scheduled follow up appointment with Dr Dahl. I called Bharath to ask him to schedule this, but there was no response. I left a VM. Consider repeat BMP after metformin uptitration.    Isidra Casey MD  PGY2 IM

## 2023-09-19 DIAGNOSIS — I10 ESSENTIAL HYPERTENSION: ICD-10-CM

## 2023-09-19 NOTE — TELEPHONE ENCOUNTER
Medicine Refill Request    Last Office Visit: 6/14/2023   Last Consult Visit: Visit date not found  Last Telemedicine Visit: Visit date not found    Next Appointment: Visit date not found      Current Outpatient Medications:     amLODIPine (NORVASC) 10 mg tablet, Take 1 tablet (10 mg total) by mouth once daily., Disp: 90 tablet, Rfl: 3    atorvastatin (LIPITOR) 20 mg tablet, Take 1 tablet (20 mg total) by mouth daily., Disp: 90 tablet, Rfl: 3    blood sugar diagnostic (FREESTYLE LITE STRIPS) strip, 1 each by Not Applicable route daily., Disp: 100 strip, Rfl: 3    cholecalciferol, vitamin D3, 25 mcg (1,000 unit) capsule, TAKE 1 CAPSULE BY MOUTH EVERY DAY Strength: 25 mcg (1,000 unit), Disp: 30 capsule, Rfl: 6    hydrochlorothiazide (HYDRODIURIL) 12.5 mg tablet, Take 1 tablet (12.5 mg total) by mouth daily., Disp: 90 tablet, Rfl: 1    metFORMIN (GLUCOPHAGE) 500 mg tablet, Take 2 tablets (1,000 mg total) by mouth 2 (two) times a day with meals., Disp: 180 tablet, Rfl: 1    metoprolol succinate XL (TOPROL-XL) 50 mg 24 hr tablet, Take 1 tablet (50 mg total) by mouth daily., Disp: 90 tablet, Rfl: 3    miscellaneous medical supply (BLOOD PRESSURE CUFF) misc, 1 each by Not Applicable route daily., Disp: , Rfl:       BP Readings from Last 3 Encounters:   06/14/23 135/60   03/15/23 (!) 168/60   02/20/23 (!) 176/79       Recent Lab results:  Lab Results   Component Value Date    CHOL 146 06/14/2023   ,   Lab Results   Component Value Date    HDL 82 06/14/2023   ,   Lab Results   Component Value Date    LDLCALC 47 06/14/2023   ,   Lab Results   Component Value Date    TRIG 96 06/14/2023        Lab Results   Component Value Date    GLUCOSE 168 (H) 06/14/2023   ,   Lab Results   Component Value Date    HGBA1C 7.8 (H) 06/14/2023         Lab Results   Component Value Date    CREATININE 1.04 (H) 06/14/2023       Lab Results   Component Value Date    TSH 1.240 05/03/2021           Lab Results   Component Value Date    HGBA1C  7.8 (H) 06/14/2023

## 2023-09-20 DIAGNOSIS — I10 ESSENTIAL HYPERTENSION: ICD-10-CM

## 2023-09-20 RX ORDER — AMLODIPINE BESYLATE 10 MG/1
10 TABLET ORAL
Qty: 90 TABLET | Refills: 3 | Status: SHIPPED | OUTPATIENT
Start: 2023-09-20 | End: 2024-05-28 | Stop reason: SDUPTHER

## 2023-09-20 RX ORDER — HYDROCHLOROTHIAZIDE 12.5 MG/1
12.5 TABLET ORAL DAILY
Qty: 90 TABLET | Refills: 1 | Status: SHIPPED | OUTPATIENT
Start: 2023-09-20 | End: 2024-03-15 | Stop reason: SDUPTHER

## 2023-09-20 RX ORDER — METFORMIN HYDROCHLORIDE 500 MG/1
1000 TABLET ORAL 2 TIMES DAILY WITH MEALS
Qty: 180 TABLET | Refills: 1 | Status: SHIPPED | OUTPATIENT
Start: 2023-09-20 | End: 2023-09-29 | Stop reason: SDUPTHER

## 2023-09-29 DIAGNOSIS — I10 ESSENTIAL HYPERTENSION: ICD-10-CM

## 2023-09-29 RX ORDER — METFORMIN HYDROCHLORIDE 500 MG/1
1000 TABLET ORAL 2 TIMES DAILY WITH MEALS
Qty: 180 TABLET | Refills: 1 | Status: SHIPPED | OUTPATIENT
Start: 2023-09-29 | End: 2024-02-21 | Stop reason: SDUPTHER

## 2023-09-29 NOTE — TELEPHONE ENCOUNTER
Pharmacy requesting a 90-day supply of Metformin 500 mg. Please send updated prescription to [facility] in [place].

## 2024-02-21 RX ORDER — METFORMIN HYDROCHLORIDE 500 MG/1
1000 TABLET ORAL 2 TIMES DAILY WITH MEALS
Qty: 180 TABLET | Refills: 1 | Status: SHIPPED | OUTPATIENT
Start: 2024-02-21 | End: 2024-02-29 | Stop reason: SDUPTHER

## 2024-02-21 NOTE — TELEPHONE ENCOUNTER
Medicine Refill Request    Last Office Visit: 6/14/2023   Last Consult Visit: Visit date not found  Last Telemedicine Visit: Visit date not found    Next Appointment: Visit date not found      Current Outpatient Medications:   •  amLODIPine (NORVASC) 10 mg tablet, Take 1 tablet (10 mg total) by mouth once daily., Disp: 90 tablet, Rfl: 3  •  atorvastatin (LIPITOR) 20 mg tablet, Take 1 tablet (20 mg total) by mouth daily., Disp: 90 tablet, Rfl: 3  •  blood sugar diagnostic (FREESTYLE LITE STRIPS) strip, 1 each by Not Applicable route daily., Disp: 100 strip, Rfl: 3  •  cholecalciferol, vitamin D3, 25 mcg (1,000 unit) capsule, TAKE 1 CAPSULE BY MOUTH EVERY DAY Strength: 25 mcg (1,000 unit), Disp: 30 capsule, Rfl: 6  •  hydrochlorothiazide (HYDRODIURIL) 12.5 mg tablet, Take 1 tablet (12.5 mg total) by mouth daily., Disp: 90 tablet, Rfl: 1  •  metFORMIN (GLUCOPHAGE) 500 mg tablet, Take 2 tablets (1,000 mg total) by mouth 2 (two) times a day with meals., Disp: 180 tablet, Rfl: 1  •  metoprolol succinate XL (TOPROL-XL) 50 mg 24 hr tablet, Take 1 tablet (50 mg total) by mouth daily., Disp: 90 tablet, Rfl: 3  •  miscellaneous medical supply (BLOOD PRESSURE CUFF) misc, 1 each by Not Applicable route daily., Disp: , Rfl:       BP Readings from Last 3 Encounters:   06/14/23 135/60   03/15/23 (!) 168/60   02/20/23 (!) 176/79       Recent Lab results:  Lab Results   Component Value Date    CHOL 146 06/14/2023   ,   Lab Results   Component Value Date    HDL 82 06/14/2023   ,   Lab Results   Component Value Date    LDLCALC 47 06/14/2023   ,   Lab Results   Component Value Date    TRIG 96 06/14/2023        Lab Results   Component Value Date    GLUCOSE 168 (H) 06/14/2023   ,   Lab Results   Component Value Date    HGBA1C 7.8 (H) 06/14/2023         Lab Results   Component Value Date    CREATININE 1.04 (H) 06/14/2023       Lab Results   Component Value Date    TSH 1.240 05/03/2021           Lab Results   Component Value Date    HGBA1C  7.8 (H) 06/14/2023

## 2024-02-29 ENCOUNTER — TELEPHONE (OUTPATIENT)
Dept: INTERNAL MEDICINE | Facility: HOSPITAL | Age: 84
End: 2024-02-29
Payer: COMMERCIAL

## 2024-03-01 RX ORDER — METFORMIN HYDROCHLORIDE 500 MG/1
1000 TABLET ORAL 2 TIMES DAILY WITH MEALS
Qty: 360 TABLET | Refills: 1 | Status: SHIPPED | OUTPATIENT
Start: 2024-03-01 | End: 2024-05-28 | Stop reason: SDUPTHER

## 2024-03-01 NOTE — TELEPHONE ENCOUNTER
Pharmacy faxed in a request for a 90-day supply of Metformin. Please send medication to [facility] in [place].

## 2024-03-15 DIAGNOSIS — I10 ESSENTIAL HYPERTENSION: ICD-10-CM

## 2024-03-15 RX ORDER — HYDROCHLOROTHIAZIDE 12.5 MG/1
12.5 TABLET ORAL DAILY
Qty: 90 TABLET | Refills: 3 | Status: SHIPPED | OUTPATIENT
Start: 2024-03-15 | End: 2024-05-28 | Stop reason: SDUPTHER

## 2024-03-15 NOTE — TELEPHONE ENCOUNTER
Medicine Refill Request    Last Office Visit: 6/14/2023   Last Consult Visit: Visit date not found  Last Telemedicine Visit: Visit date not found    Next Appointment: Visit date not found      Current Outpatient Medications:   •  amLODIPine (NORVASC) 10 mg tablet, Take 1 tablet (10 mg total) by mouth once daily., Disp: 90 tablet, Rfl: 3  •  atorvastatin (LIPITOR) 20 mg tablet, Take 1 tablet (20 mg total) by mouth daily., Disp: 90 tablet, Rfl: 3  •  blood sugar diagnostic (FREESTYLE LITE STRIPS) strip, 1 each by Not Applicable route daily., Disp: 100 strip, Rfl: 3  •  cholecalciferol, vitamin D3, 25 mcg (1,000 unit) capsule, TAKE 1 CAPSULE BY MOUTH EVERY DAY Strength: 25 mcg (1,000 unit), Disp: 30 capsule, Rfl: 6  •  hydrochlorothiazide (HYDRODIURIL) 12.5 mg tablet, Take 1 tablet (12.5 mg total) by mouth daily., Disp: 90 tablet, Rfl: 1  •  metFORMIN (GLUCOPHAGE) 500 mg tablet, Take 2 tablets (1,000 mg total) by mouth 2 (two) times a day with meals., Disp: 360 tablet, Rfl: 1  •  metoprolol succinate XL (TOPROL-XL) 50 mg 24 hr tablet, Take 1 tablet (50 mg total) by mouth daily., Disp: 90 tablet, Rfl: 3  •  miscellaneous medical supply (BLOOD PRESSURE CUFF) misc, 1 each by Not Applicable route daily., Disp: , Rfl:       BP Readings from Last 3 Encounters:   06/14/23 135/60   03/15/23 (!) 168/60   02/20/23 (!) 176/79       Recent Lab results:  Lab Results   Component Value Date    CHOL 146 06/14/2023   ,   Lab Results   Component Value Date    HDL 82 06/14/2023   ,   Lab Results   Component Value Date    LDLCALC 47 06/14/2023   ,   Lab Results   Component Value Date    TRIG 96 06/14/2023        Lab Results   Component Value Date    GLUCOSE 168 (H) 06/14/2023   ,   Lab Results   Component Value Date    HGBA1C 7.8 (H) 06/14/2023         Lab Results   Component Value Date    CREATININE 1.04 (H) 06/14/2023       Lab Results   Component Value Date    TSH 1.240 05/03/2021           Lab Results   Component Value Date    HGBA1C  7.8 (H) 06/14/2023

## 2024-04-02 DIAGNOSIS — I25.10 CORONARY ARTERY DISEASE INVOLVING NATIVE CORONARY ARTERY OF NATIVE HEART WITHOUT ANGINA PECTORIS: ICD-10-CM

## 2024-04-02 RX ORDER — ATORVASTATIN CALCIUM 20 MG/1
20 TABLET, FILM COATED ORAL DAILY
Qty: 90 TABLET | Refills: 3 | Status: SHIPPED | OUTPATIENT
Start: 2024-04-02 | End: 2024-05-28 | Stop reason: SDUPTHER

## 2024-04-02 NOTE — TELEPHONE ENCOUNTER
Medicine Refill Request    Last Office Visit: 6/14/2023   Last Consult Visit: Visit date not found  Last Telemedicine Visit: Visit date not found    Next Appointment: 5/28/2024      Current Outpatient Medications:     amLODIPine (NORVASC) 10 mg tablet, Take 1 tablet (10 mg total) by mouth once daily., Disp: 90 tablet, Rfl: 3    atorvastatin (LIPITOR) 20 mg tablet, Take 1 tablet (20 mg total) by mouth daily., Disp: 90 tablet, Rfl: 3    blood sugar diagnostic (FREESTYLE LITE STRIPS) strip, 1 each by Not Applicable route daily., Disp: 100 strip, Rfl: 3    cholecalciferol, vitamin D3, 25 mcg (1,000 unit) capsule, TAKE 1 CAPSULE BY MOUTH EVERY DAY Strength: 25 mcg (1,000 unit), Disp: 30 capsule, Rfl: 6    hydrochlorothiazide 12.5 mg tablet, Take 1 tablet (12.5 mg total) by mouth daily., Disp: 90 tablet, Rfl: 3    metFORMIN (GLUCOPHAGE) 500 mg tablet, Take 2 tablets (1,000 mg total) by mouth 2 (two) times a day with meals., Disp: 360 tablet, Rfl: 1    metoprolol succinate XL (TOPROL-XL) 50 mg 24 hr tablet, Take 1 tablet (50 mg total) by mouth daily., Disp: 90 tablet, Rfl: 3    miscellaneous medical supply (BLOOD PRESSURE CUFF) misc, 1 each by Not Applicable route daily., Disp: , Rfl:       BP Readings from Last 3 Encounters:   06/14/23 135/60   03/15/23 (!) 168/60   02/20/23 (!) 176/79       Recent Lab results:  Lab Results   Component Value Date    CHOL 146 06/14/2023   ,   Lab Results   Component Value Date    HDL 82 06/14/2023   ,   Lab Results   Component Value Date    LDLCALC 47 06/14/2023   ,   Lab Results   Component Value Date    TRIG 96 06/14/2023        Lab Results   Component Value Date    GLUCOSE 168 (H) 06/14/2023   ,   Lab Results   Component Value Date    HGBA1C 7.8 (H) 06/14/2023         Lab Results   Component Value Date    CREATININE 1.04 (H) 06/14/2023       Lab Results   Component Value Date    TSH 1.240 05/03/2021           Lab Results   Component Value Date    HGBA1C 7.8 (H) 06/14/2023

## 2024-05-28 ENCOUNTER — APPOINTMENT (OUTPATIENT)
Dept: LAB | Facility: HOSPITAL | Age: 84
End: 2024-05-28
Attending: STUDENT IN AN ORGANIZED HEALTH CARE EDUCATION/TRAINING PROGRAM
Payer: COMMERCIAL

## 2024-05-28 ENCOUNTER — OFFICE VISIT (OUTPATIENT)
Dept: INTERNAL MEDICINE | Facility: HOSPITAL | Age: 84
End: 2024-05-28
Payer: COMMERCIAL

## 2024-05-28 VITALS
WEIGHT: 105.9 LBS | DIASTOLIC BLOOD PRESSURE: 60 MMHG | BODY MASS INDEX: 21.35 KG/M2 | OXYGEN SATURATION: 99 % | SYSTOLIC BLOOD PRESSURE: 130 MMHG | HEART RATE: 56 BPM | HEIGHT: 59 IN | TEMPERATURE: 97.8 F | RESPIRATION RATE: 18 BRPM

## 2024-05-28 DIAGNOSIS — I10 ESSENTIAL HYPERTENSION: Primary | ICD-10-CM

## 2024-05-28 DIAGNOSIS — E11.9 TYPE 2 DIABETES MELLITUS WITHOUT COMPLICATION, WITHOUT LONG-TERM CURRENT USE OF INSULIN (CMS/HCC): ICD-10-CM

## 2024-05-28 DIAGNOSIS — I25.10 CORONARY ARTERY DISEASE INVOLVING NATIVE CORONARY ARTERY OF NATIVE HEART WITHOUT ANGINA PECTORIS: ICD-10-CM

## 2024-05-28 DIAGNOSIS — M85.89 OSTEOPENIA OF MULTIPLE SITES: ICD-10-CM

## 2024-05-28 DIAGNOSIS — R41.89 COGNITIVE IMPAIRMENT: ICD-10-CM

## 2024-05-28 DIAGNOSIS — E11.9 ENCOUNTER FOR DIABETIC FOOT EXAM (CMS/HCC): ICD-10-CM

## 2024-05-28 DIAGNOSIS — E11.9 TYPE 2 DIABETES MELLITUS WITHOUT COMPLICATIONS (CMS/HCC): ICD-10-CM

## 2024-05-28 DIAGNOSIS — E78.5 HYPERLIPIDEMIA, UNSPECIFIED HYPERLIPIDEMIA TYPE: ICD-10-CM

## 2024-05-28 PROCEDURE — 99214 OFFICE O/P EST MOD 30 MIN: CPT | Mod: GC | Performed by: STUDENT IN AN ORGANIZED HEALTH CARE EDUCATION/TRAINING PROGRAM

## 2024-05-28 PROCEDURE — 3075F SYST BP GE 130 - 139MM HG: CPT | Performed by: STUDENT IN AN ORGANIZED HEALTH CARE EDUCATION/TRAINING PROGRAM

## 2024-05-28 PROCEDURE — 3078F DIAST BP <80 MM HG: CPT | Performed by: STUDENT IN AN ORGANIZED HEALTH CARE EDUCATION/TRAINING PROGRAM

## 2024-05-28 PROCEDURE — 3008F BODY MASS INDEX DOCD: CPT | Performed by: STUDENT IN AN ORGANIZED HEALTH CARE EDUCATION/TRAINING PROGRAM

## 2024-05-28 RX ORDER — VIT C/E/ZN/COPPR/LUTEIN/ZEAXAN 250MG-90MG
CAPSULE ORAL
Qty: 30 CAPSULE | Refills: 6 | Status: SHIPPED | OUTPATIENT
Start: 2024-05-28 | End: 2024-08-19 | Stop reason: SDUPTHER

## 2024-05-28 RX ORDER — METFORMIN HYDROCHLORIDE 500 MG/1
1000 TABLET ORAL 2 TIMES DAILY WITH MEALS
Qty: 360 TABLET | Refills: 1 | Status: SHIPPED | OUTPATIENT
Start: 2024-05-28 | End: 2024-09-25 | Stop reason: SDUPTHER

## 2024-05-28 RX ORDER — ATORVASTATIN CALCIUM 20 MG/1
20 TABLET, FILM COATED ORAL DAILY
Qty: 90 TABLET | Refills: 3 | Status: SHIPPED | OUTPATIENT
Start: 2024-05-28 | End: 2025-05-23

## 2024-05-28 RX ORDER — HYDROCHLOROTHIAZIDE 12.5 MG/1
12.5 TABLET ORAL DAILY
Qty: 90 TABLET | Refills: 3 | Status: SHIPPED | OUTPATIENT
Start: 2024-05-28 | End: 2025-05-23

## 2024-05-28 RX ORDER — METOPROLOL SUCCINATE 50 MG/1
50 TABLET, EXTENDED RELEASE ORAL DAILY
Qty: 90 TABLET | Refills: 3 | Status: SHIPPED | OUTPATIENT
Start: 2024-05-28 | End: 2025-05-23

## 2024-05-28 RX ORDER — AMLODIPINE BESYLATE 10 MG/1
10 TABLET ORAL
Qty: 90 TABLET | Refills: 3 | Status: SHIPPED | OUTPATIENT
Start: 2024-05-28

## 2024-05-28 ASSESSMENT — PATIENT HEALTH QUESTIONNAIRE - PHQ9
SUM OF ALL RESPONSES TO PHQ9 QUESTIONS 1 & 2: 0
SUM OF ALL RESPONSES TO PHQ9 QUESTIONS 1 & 2: 0

## 2024-05-28 ASSESSMENT — PAIN SCALES - GENERAL: PAINLEVEL: 0-NO PAIN

## 2024-05-28 NOTE — ASSESSMENT & PLAN NOTE
Previous A1c 7.8%, A1c today 8.1%  On metformin 1000 mg twice daily  Diabetic foot exam done today     - Continue metformin  - Start jardiance 10 mg given elevated A1c  -Referral provided to [facility]

## 2024-05-28 NOTE — ASSESSMENT & PLAN NOTE
Hx of mild cognitive impairment (MMSE 23/30), this has been stable over the past year.     - Referral for neuropsychological testing

## 2024-05-28 NOTE — PROGRESS NOTES
[facility]  Internal Medicine Progress Note       ASSESSMENT AND PLAN     Essential hypertension  BP today 130/60    -Continue current regimen : Amlodipine 10 mg daily, HCTZ 12.5 mg and metoprolol 50 mg daily.       HLD (hyperlipidemia)  - Continue atorvastatin 20 mg daily  - Follow up repeat lipid panel    Type 2 diabetes mellitus (CMS/[facility])   Previous A1c 7.8%, A1c today 8.1%  On metformin 1000 mg twice daily  Diabetic foot exam done today     - Continue metformin  - Start jardiance 10 mg given elevated A1c  -Referral provided to Optho       Cognitive impairment  Hx of mild cognitive impairment (MMSE 23/30), this has been stable over the past year.     - Referral for neuropsychological testing      Health care maintenance:   Due for DEXA scan, provided with referral   Vaccines UTD    Return in about 6 months (around 11/28/2024).      Sugey Dahl MD     SUBJECTIVE   Grazyna Bejarano is a 82 y.o. year-old female, primary patient of Dr. Dahl with a past medical history of cataracts, DM2, HTN, osteopenia, hearing loss, vitamin D deficiency, and mild cognitive impairment who presents for follow up.      Interval History: Patient present with son-in law for follow up today. She has no vomplaints. Has been feeling well in herself. Takes all he rmedications. Lives at home with her daughter, son in law and grand kids. She had a fall in the bathroom a few months ago because she tripped. She did not loose consciousness and did not injure herself. She has not had any falls since.     One year ago, she was told to increase her metformin to 1G BID for elevated A1c and to start jardiance which she did not want to start.     Her son-in-law notes that she has been having some issues with her memory. She occasionally forgets where she places things and can sometimes not remember resent conversations. He requests more comprehensive testing. He notes her memory issues have been stable for the past  "year or so.        PHYSICAL EXAMINATION   Visit Vitals  /60 (BP Location: Left upper arm, Patient Position: Sitting) Comment: v   Pulse (!) 56   Temp 36.6 °C (97.8 °F) (Temporal)   Resp 18   Ht 1.499 m (4' 11\")   Wt 48 kg (105 lb 14.4 oz)   SpO2 99%   BMI 21.39 kg/m²       Physical Exam  Constitutional:       General: She is not in acute distress.     Appearance: She is not ill-appearing, toxic-appearing or diaphoretic.   HENT:      Head: Normocephalic and atraumatic.      Nose: Nose normal.      Mouth/Throat:      Mouth: Mucous membranes are moist.   Eyes:      Pupils: Pupils are equal, round, and reactive to light.   Cardiovascular:      Rate and Rhythm: Normal rate and regular rhythm.      Pulses: Normal pulses.      Heart sounds: Normal heart sounds.   Pulmonary:      Effort: Pulmonary effort is normal.      Breath sounds: Normal breath sounds.   Abdominal:      General: Abdomen is flat. Bowel sounds are normal. There is no distension.      Palpations: Abdomen is soft.      Tenderness: There is no abdominal tenderness.   Musculoskeletal:      Cervical back: Normal range of motion.      Right lower leg: No edema.      Left lower leg: No edema.   Skin:     General: Skin is warm.      Capillary Refill: Capillary refill takes less than 2 seconds.   Neurological:      General: No focal deficit present.      Mental Status: She is alert and oriented to person, place, and time.   Psychiatric:         Mood and Affect: Mood normal.           LABS / IMAGING / STUDIES   Labs Reviewed:   Last BMP:  Lab Results   Component Value Date     06/14/2023    K 4.6 06/14/2023     06/14/2023    CO2 28 06/14/2023    BUN 27 06/14/2023    CREATININE 1.04 (H) 06/14/2023       Last CBC:  Lab Results   Component Value Date    WBC 5.5 06/15/2022    HGB 11.5 06/15/2022    HCT 36.4 06/15/2022    MCV 87 06/15/2022     06/15/2022       Last Lipids:  Lab Results   Component Value Date    CHOL 131 05/28/2024    HDL 74 " 05/28/2024    LDLCALC 43 05/28/2024    TRIG 72 05/28/2024       Last three HgbA1c:  Lab Results   Component Value Date    HGBA1C 8.1 (H) 05/28/2024    HGBA1C 7.8 (H) 06/14/2023    HGBA1C 6.2 (H) 06/15/2022       Last Microalbumin:  Lab Results   Component Value Date    MICROALBUR 30.7 06/14/2023       Last TSH:  Lab Results   Component Value Date    TSH 1.240 05/03/2021       Last Vitamin D:  Lab Results   Component Value Date    NCMP80UX 29.9 (L) 06/15/2022             MEDICATIONS      Current Outpatient Medications   Medication Instructions    amLODIPine (NORVASC) 10 mg, oral, Daily (8a)    atorvastatin (LIPITOR) 20 mg, oral, Daily    blood sugar diagnostic (FREESTYLE LITE STRIPS) strip 1 each, Not Applicable, Daily    cholecalciferol, vitamin D3, 25 mcg (1,000 unit) capsule TAKE 1 CAPSULE BY MOUTH EVERY DAY<BR>Strength: 25 mcg (1,000 unit)    empagliflozin (JARDIANCE) 10 mg, oral, Daily    hydrochlorothiazide 12.5 mg, oral, Daily    metFORMIN (GLUCOPHAGE) 1,000 mg, oral, 2 times daily with meals    metoprolol succinate XL (TOPROL-XL) 50 mg, oral, Daily    miscellaneous medical supply (BLOOD PRESSURE CUFF) misc 1 each, Not Applicable, Daily

## 2024-05-28 NOTE — ASSESSMENT & PLAN NOTE
BP today 130/60    -Continue current regimen : Amlodipine 10 mg daily, HCTZ 12.5 mg and metoprolol 50 mg daily.

## 2024-05-28 NOTE — PATIENT INSTRUCTIONS
Please get your lab work done.     For memory testing contact this number     [email or web address] ([phone])

## 2024-05-29 ENCOUNTER — TELEPHONE (OUTPATIENT)
Dept: INTERNAL MEDICINE | Facility: HOSPITAL | Age: 84
End: 2024-05-29
Payer: COMMERCIAL

## 2024-05-29 DIAGNOSIS — E11.9 TYPE 2 DIABETES MELLITUS WITHOUT COMPLICATION, WITHOUT LONG-TERM CURRENT USE OF INSULIN (CMS/HCC): Primary | ICD-10-CM

## 2024-05-29 LAB
ALBUMIN/CREAT UR: 11 MG/G CREAT (ref 0–29)
CHOLEST SERPL-MCNC: 131 MG/DL (ref 100–199)
CREAT UR-MCNC: 142.1 MG/DL
HBA1C MFR BLD: 8.1 % (ref 4.8–5.6)
HDLC SERPL-MCNC: 74 MG/DL
LDLC SERPL CALC-MCNC: 43 MG/DL (ref 0–99)
MICROALBUMIN UR-MCNC: 15.9 UG/ML
TRIGL SERPL-MCNC: 72 MG/DL (ref 0–149)
VLDLC SERPL CALC-MCNC: 14 MG/DL (ref 5–40)

## 2024-05-29 NOTE — TELEPHONE ENCOUNTER
----- Message from Sugey Dahl MD sent at 5/29/2024  8:48 AM EDT -----  Can you please call her relative (Bharath) and book her in for follow up in 3 months? thanks

## 2024-05-29 NOTE — TELEPHONE ENCOUNTER
Called patient's son in law and updated on elevated A1c. Agreeable to starting jardiance. Counseled extensively on ifestyle changes and advised to scale back on carbohydrates. Son says patient eats a lot of bread because that is one of the ways they can get her to eat food. Provided referral to nutritionist. Will need follow up in 3 months for repeat A1c. Advised to check Bgs at home, son says this may be difficult as patient really does not like this

## 2024-06-04 NOTE — PROGRESS NOTES
I performed a history and physical examination of the patient and discussed the management with the Resident. I reviewed the Resident's note and agree with the documented findings and plan of care, except for my comments below or within the additional notes today.A1c worse at 8.1 compared to 7.8. Will add Jardiance to metformin and follow glucose. Along with A1c

## 2024-06-10 ENCOUNTER — TELEPHONE (OUTPATIENT)
Dept: INTERNAL MEDICINE | Facility: HOSPITAL | Age: 84
End: 2024-06-10
Payer: COMMERCIAL

## 2024-06-10 DIAGNOSIS — E11.9 TYPE 2 DIABETES MELLITUS WITHOUT COMPLICATION, WITHOUT LONG-TERM CURRENT USE OF INSULIN (CMS/HCC): Primary | ICD-10-CM

## 2024-06-10 RX ORDER — DEXTROSE 4 G
1 TABLET,CHEWABLE ORAL SEE ADMIN INSTRUCTIONS
Qty: 1 EACH | Refills: 0 | Status: SHIPPED | OUTPATIENT
Start: 2024-06-10

## 2024-06-10 RX ORDER — IBUPROFEN 200 MG
1 CAPSULE ORAL
Qty: 100 STRIP | Refills: 1 | Status: SHIPPED | OUTPATIENT
Start: 2024-06-10

## 2024-06-10 RX ORDER — LANCETS
1 EACH MISCELLANEOUS
Qty: 100 EACH | Refills: 1 | Status: SHIPPED | OUTPATIENT
Start: 2024-06-10

## 2024-06-10 NOTE — TELEPHONE ENCOUNTER
Medicine Refill Request    Last Office Visit: 5/28/2024   Last Consult Visit: Visit date not found  Last Telemedicine Visit: Visit date not found    Next Appointment: 8/19/2024      Current Outpatient Medications:     amLODIPine (NORVASC) 10 mg tablet, Take 1 tablet (10 mg total) by mouth once daily., Disp: 90 tablet, Rfl: 3    atorvastatin (LIPITOR) 20 mg tablet, Take 1 tablet (20 mg total) by mouth daily., Disp: 90 tablet, Rfl: 3    cholecalciferol, vitamin D3, 25 mcg (1,000 unit) capsule, TAKE 1 CAPSULE BY MOUTH EVERY DAY Strength: 25 mcg (1,000 unit), Disp: 30 capsule, Rfl: 6    empagliflozin (JARDIANCE) 10 mg tablet, Take 1 tablet (10 mg total) by mouth daily., Disp: 90 tablet, Rfl: 1    hydrochlorothiazide 12.5 mg tablet, Take 1 tablet (12.5 mg total) by mouth daily., Disp: 90 tablet, Rfl: 3    metFORMIN (GLUCOPHAGE) 500 mg tablet, Take 2 tablets (1,000 mg total) by mouth 2 (two) times a day with meals., Disp: 360 tablet, Rfl: 1    metoprolol succinate XL (TOPROL-XL) 50 mg 24 hr tablet, Take 1 tablet (50 mg total) by mouth daily., Disp: 90 tablet, Rfl: 3    miscellaneous medical supply (BLOOD PRESSURE CUFF) misc, 1 each by Not Applicable route daily., Disp: , Rfl:       BP Readings from Last 3 Encounters:   05/28/24 130/60   06/14/23 135/60   03/15/23 (!) 168/60       Recent Lab results:  Lab Results   Component Value Date    CHOL 131 05/28/2024   ,   Lab Results   Component Value Date    HDL 74 05/28/2024   ,   Lab Results   Component Value Date    LDLCALC 43 05/28/2024   ,   Lab Results   Component Value Date    TRIG 72 05/28/2024        Lab Results   Component Value Date    GLUCOSE 168 (H) 06/14/2023   ,   Lab Results   Component Value Date    HGBA1C 8.1 (H) 05/28/2024         Lab Results   Component Value Date    CREATININE 1.04 (H) 06/14/2023       Lab Results   Component Value Date    TSH 1.240 05/03/2021           Lab Results   Component Value Date    HGBA1C 8.1 (H) 05/28/2024

## 2024-06-10 NOTE — TELEPHONE ENCOUNTER
Bharath patient EC called and is requesting a glucose meter for pt. He stated that it will be better for pt

## 2024-06-12 ENCOUNTER — TELEPHONE (OUTPATIENT)
Dept: INTERNAL MEDICINE | Facility: HOSPITAL | Age: 84
End: 2024-06-12
Payer: COMMERCIAL

## 2024-06-12 NOTE — TELEPHONE ENCOUNTER
Pt's relative called and is asking for a CGM for pt, the kind that she will wear on her arm that has the sensors.

## 2024-06-12 NOTE — TELEPHONE ENCOUNTER
Pt was called notifying her that the physician called pt several times with no response. Per the physician, pt does not qualify for the monitor with the sensors and she does not need it based on the medications she is taking.    Pt to call back if there are any additional questions.

## 2024-06-19 ENCOUNTER — TELEPHONE (OUTPATIENT)
Dept: INTERNAL MEDICINE | Facility: HOSPITAL | Age: 84
End: 2024-06-19
Payer: COMMERCIAL

## 2024-06-19 NOTE — TELEPHONE ENCOUNTER
Bharath called regarding possible orders for Blood Glucose Meters to be sent over to [facility] pharmacy. Bharath stated that they require prior auth's based on insurance.

## 2024-06-21 ENCOUNTER — HOSPITAL ENCOUNTER (OUTPATIENT)
Dept: RADIOLOGY | Age: 84
Discharge: HOME | End: 2024-06-21
Attending: STUDENT IN AN ORGANIZED HEALTH CARE EDUCATION/TRAINING PROGRAM
Payer: COMMERCIAL

## 2024-06-21 DIAGNOSIS — M85.89 OSTEOPENIA OF MULTIPLE SITES: ICD-10-CM

## 2024-06-21 PROCEDURE — 77080 DXA BONE DENSITY AXIAL: CPT

## 2024-07-17 ENCOUNTER — TELEPHONE (OUTPATIENT)
Dept: INTERNAL MEDICINE | Facility: HOSPITAL | Age: 84
End: 2024-07-17
Payer: COMMERCIAL

## 2024-07-17 NOTE — TELEPHONE ENCOUNTER
Bharath Vasquez called concerned about pt, because someone in the home has covid. Pt has several medical issues and he wants to know what meds can be prescribed forpt.

## 2024-07-17 NOTE — TELEPHONE ENCOUNTER
Spoke with Bharath. He stated that him and his wife have tested positive for covid. He is concerned for Grazyna that she might catch it. As of right now the patient is covid negative and symptom free. He was wondering if he could get paxlovid ordered prophylaxis for the patient. He stated that he was told that it is hard to get. Please send to [facility] in [place] if appropriate.

## 2024-07-18 NOTE — TELEPHONE ENCOUNTER
Spoke with Bharath in regards to preventing Grazyna  from getting a Covid infection as both he and his wife tested positive. They have been wearing masks when around her as they are her primary caretakers.  I recommended them having her wear a mask as well as increasing air circulation in the room.  She does not have any symptoms currently.  If she does have symptoms develop I recommended supportive measures including Tylenol as well as increase fluid intake with alarm symptoms such as shortness of breath and worsening cough to prompt her to come to the ED.

## 2024-08-19 ENCOUNTER — APPOINTMENT (OUTPATIENT)
Dept: LAB | Facility: HOSPITAL | Age: 84
End: 2024-08-19
Payer: COMMERCIAL

## 2024-08-19 ENCOUNTER — OFFICE VISIT (OUTPATIENT)
Dept: INTERNAL MEDICINE | Facility: HOSPITAL | Age: 84
End: 2024-08-19
Payer: COMMERCIAL

## 2024-08-19 VITALS
HEIGHT: 59 IN | RESPIRATION RATE: 18 BRPM | SYSTOLIC BLOOD PRESSURE: 158 MMHG | BODY MASS INDEX: 20.95 KG/M2 | OXYGEN SATURATION: 99 % | WEIGHT: 103.9 LBS | HEART RATE: 56 BPM | DIASTOLIC BLOOD PRESSURE: 64 MMHG | TEMPERATURE: 96.8 F

## 2024-08-19 DIAGNOSIS — M81.0 OSTEOPOROSIS WITHOUT CURRENT PATHOLOGICAL FRACTURE, UNSPECIFIED OSTEOPOROSIS TYPE: ICD-10-CM

## 2024-08-19 DIAGNOSIS — E55.9 VITAMIN D DEFICIENCY, UNSPECIFIED: ICD-10-CM

## 2024-08-19 DIAGNOSIS — E55.9 VITAMIN D DEFICIENCY: ICD-10-CM

## 2024-08-19 DIAGNOSIS — I10 ESSENTIAL HYPERTENSION: ICD-10-CM

## 2024-08-19 DIAGNOSIS — R41.89 COGNITIVE IMPAIRMENT: ICD-10-CM

## 2024-08-19 DIAGNOSIS — R41.89 OTHER SYMPTOMS AND SIGNS INVOLVING COGNITIVE FUNCTIONS AND AWARENESS: ICD-10-CM

## 2024-08-19 DIAGNOSIS — E11.9 TYPE 2 DIABETES MELLITUS WITHOUT COMPLICATION, WITHOUT LONG-TERM CURRENT USE OF INSULIN (CMS/HCC): Primary | ICD-10-CM

## 2024-08-19 PROCEDURE — 99214 OFFICE O/P EST MOD 30 MIN: CPT | Mod: GC,25

## 2024-08-19 PROCEDURE — 90677 PCV20 VACCINE IM: CPT

## 2024-08-19 PROCEDURE — 3008F BODY MASS INDEX DOCD: CPT

## 2024-08-19 PROCEDURE — 3077F SYST BP >= 140 MM HG: CPT

## 2024-08-19 PROCEDURE — 3E0234Z INTRODUCTION OF SERUM, TOXOID AND VACCINE INTO MUSCLE, PERCUTANEOUS APPROACH: ICD-10-PCS | Performed by: INTERNAL MEDICINE

## 2024-08-19 PROCEDURE — 99900024 HB NONBILLABLE HOSPITAL CLINIC SERVICE: Mod: 25 | Performed by: INTERNAL MEDICINE

## 2024-08-19 PROCEDURE — 3078F DIAST BP <80 MM HG: CPT

## 2024-08-19 RX ORDER — VIT C/E/ZN/COPPR/LUTEIN/ZEAXAN 250MG-90MG
CAPSULE ORAL
Qty: 30 CAPSULE | Refills: 6 | Status: SHIPPED | OUTPATIENT
Start: 2024-08-19

## 2024-08-19 RX ORDER — ALENDRONATE SODIUM 70 MG/1
70 TABLET ORAL WEEKLY
Qty: 12 TABLET | Refills: 1 | Status: SHIPPED | OUTPATIENT
Start: 2024-08-19 | End: 2025-02-15

## 2024-08-19 ASSESSMENT — PAIN SCALES - GENERAL: PAINLEVEL: 0-NO PAIN

## 2024-08-19 NOTE — PROGRESS NOTES
[facility]  Internal Medicine Progress Note       ASSESSMENT AND PLAN     Essential hypertension  On amlodipine, HCTZ, metoprolol  BP in office 158/64    - Consider increasing HCTZ dose at next visit however risks include hyponatremia with resultant encephalopathy, dehydration in elderly  - Adjust BP medication doses with caution given fall risk in the elderly who is now diagnosed with osteoporosis    Type 2 diabetes mellitus (CMS/[facility])  Most recent A1c 8.1% 2 months ago, up from 7.8% 1 year prior  Urine/albumin normal, FLP normal in 05/2024  After prior OV in 05/2024, patient was started on Jardiance in light of rising A1c  Patient did not tolerate Jardiance, reportedly became very lethargic  She remains maintained on MFM 1000 mg bid alone    - Encouraged lifestyle and dietary modifications  - A1c is largely appropriate for her age, favor against aggressive glycemic control  - Repeat A1c at next visit    Osteoporosis without current pathological fracture  DEXA scan in 05/2024 shows T-score -2.5 in femoral neck consistent with osteoporosis  Previously vitamin D deficient and has been on vitamin D supplements although has not had refills recently  Normal GFR on most recent labs    - Repeat vitamin D level and ensure it is wnl  - If so, okay to start alendronate    Vitamin D deficiency  Vitamin D deficient in 06/2022  Has been prescribed vitamin D supplements however has not been able to obtain refills recently    - Recheck levels    Cognitive impairment  MMSE 23/30 in 05/2024  Referred to neuropsychiatry however patient has not yet seen    - Complete labs today for completeness including B12, folate, TSH, RPR  - Repeat MMSE at next visit    Health care maintenance:   - UTD on DEXA scan as of 05/2024 showing osteoporosis  - Received PCV 20 today    Return in about 3 months (around 11/19/2024).  At next visit:  - Monitor tolerance to alendronate  - Repeat BMP to monitor renal function with the  "above  - Repeat A1c to monitor for improvement with lifestyle modifications  - BP check, adjust as needed    Leelee Miller MD     SUBJECTIVE   Grazyna Bejarano is a 83 y.o. year-old female, primary patient of Dr. Graham with a past medical history of cataracts, DM2, HTN, osteopenia, hearing loss, vitamin D deficiency, and mild cognitive impairment who presents for routine follow up.     Interval History: Last seen by Dr. Dahl in 05/2024 for routine follow-up.  Started Jardiance for T2DM given rising A1c and referred to opthalmo.  Also referred to neuropsychological testing for cognitive impairment.    Patient is accompanied by her son in law Sinha who provides much of collateral history. Jardiance upon initiation on 5/29/2024 seemed to cause significant lethargy so it was self DC and her and her family trialed lifestyle modifications instead. Recent AM POCT have been 100-110s. Otherwise she has been in her usual state of health and offers no complaints including neuropathy, vision changes, chest pain, SUERO, N/V/D although her son in law mentions she has some bilateral hip pain with ambulation that improves with rest, without any other symptoms related to extremities.     PHYSICAL EXAMINATION   Visit Vitals  BP (!) 158/64 (BP Location: Right upper arm, Patient Position: Sitting)   Pulse (!) 56   Temp (!) 36 °C (96.8 °F) (Temporal)   Resp 18   Ht 1.499 m (4' 11\")   Wt 47.1 kg (103 lb 14.4 oz)   SpO2 99%   BMI 20.99 kg/m²       Physical Exam  Constitutional:       General: She is not in acute distress.     Appearance: She is normal weight.      Comments: Pleasant, well-appearing   HENT:      Head: Normocephalic and atraumatic.      Mouth/Throat:      Mouth: Mucous membranes are moist.   Eyes:      Extraocular Movements: Extraocular movements intact.   Cardiovascular:      Rate and Rhythm: Normal rate.      Pulses: Normal pulses.   Pulmonary:      Effort: Pulmonary effort is normal.   Abdominal:      General: " There is no distension.      Palpations: Abdomen is soft.      Tenderness: There is no abdominal tenderness.   Musculoskeletal:      Right lower leg: No edema.      Left lower leg: No edema.   Skin:     General: Skin is warm and dry.   Neurological:      Mental Status: She is alert. Mental status is at baseline.   Psychiatric:         Mood and Affect: Mood normal.         Behavior: Behavior normal.         Thought Content: Thought content normal.         Judgment: Judgment normal.       LABS / IMAGING / STUDIES   Labs Reviewed: A1c, CMP    Studies/Imaging Reviewed: DEXA scan       MEDICATIONS      Current Outpatient Medications   Medication Instructions    alendronate (FOSAMAX) 70 mg, oral, Weekly, Take on an empty stomach and do not lie down for the next 30 min.    amLODIPine (NORVASC) 10 mg, oral, Daily (8a)    atorvastatin (LIPITOR) 20 mg, oral, Daily    blood sugar diagnostic (glucose blood) strip 1 strip, miscellaneous (specify), Daily AND PRN, Use to test Blood Glucose daily and as needed    blood-glucose meter misc 1 kit, miscellaneous (specify), See admin instructions, Use to test Blood Glucose as directed    cholecalciferol, vitamin D3, 25 mcg (1,000 unit) capsule TAKE 1 CAPSULE BY MOUTH EVERY DAY<BR>Strength: 25 mcg (1,000 unit)    empagliflozin (JARDIANCE) 10 mg, oral, Daily    hydrochlorothiazide 12.5 mg, oral, Daily    lancets misc 1 Lancet, miscellaneous (specify), Daily AND PRN, Use to test Blood Glucose daily and as needed    metFORMIN (GLUCOPHAGE) 1,000 mg, oral, 2 times daily with meals    metoprolol succinate XL (TOPROL-XL) 50 mg, oral, Daily    miscellaneous medical supply (BLOOD PRESSURE CUFF) misc 1 each, Not Applicable, Daily

## 2024-08-19 NOTE — PATIENT INSTRUCTIONS
-Please complete blood work at your earliest convenience.  -We will check your vitamin D level as well before starting a medication called Fosamax (alendronate) to help build bone density.  -You received a pneumonia vaccine today.        Thank you for allowing us to participate in your care!

## 2024-08-19 NOTE — PROGRESS NOTES
I performed a history and physical examination of the patient and discussed the management with the Resident. I reviewed the Resident's note and agree with the documented findings and plan of care, except for my comments below or within the additional notes today.  Here with son in law. Her OP on DXA, eval of Vit D status, and treatment discussed in detail.  Agreeable to PCV 20 today.  Agree with rest of plan.    CHRISTOPH Bajwa MD

## 2024-08-20 ENCOUNTER — TELEPHONE (OUTPATIENT)
Dept: INTERNAL MEDICINE | Facility: HOSPITAL | Age: 84
End: 2024-08-20
Payer: COMMERCIAL

## 2024-08-20 LAB
25(OH)D3+25(OH)D2 SERPL-MCNC: 66.3 NG/ML (ref 30–100)
FOLATE SERPL-MCNC: >20 NG/ML
RPR SER QL: NON REACTIVE
T4 FREE SERPL-MCNC: 1.52 NG/DL (ref 0.82–1.77)
TSH SERPL DL<=0.005 MIU/L-ACNC: 0.9 UIU/ML (ref 0.45–4.5)
VIT B12 SERPL-MCNC: 380 PG/ML (ref 232–1245)

## 2024-08-20 NOTE — TELEPHONE ENCOUNTER
Spoke with Bharath patient's son-in-law who accompanied her at OV yesterday.  Relayed results of normal B12, folate, TSH, vitamin D.  I told him he is okay to collect Fosamax and start this medication for the patient given normal vitamin D levels.  He was appreciative of the call and updates.        Leelee Miller MD  Internal Medicine, PGY-3  [phone]

## 2024-08-21 PROBLEM — M81.0 OSTEOPOROSIS WITHOUT CURRENT PATHOLOGICAL FRACTURE: Status: ACTIVE | Noted: 2024-08-21

## 2024-08-21 NOTE — ASSESSMENT & PLAN NOTE
MMSE 23/30 in 05/2024  Referred to neuropsychiatry however patient has not yet seen    - Complete labs today for completeness including B12, folate, TSH, RPR  - Repeat MMSE at next visit

## 2024-08-21 NOTE — ASSESSMENT & PLAN NOTE
DEXA scan in 05/2024 shows T-score -2.5 in femoral neck consistent with osteoporosis  Previously vitamin D deficient and has been on vitamin D supplements although has not had refills recently  Normal GFR on most recent labs    - Repeat vitamin D level and ensure it is wnl  - If so, okay to start alendronate

## 2024-08-21 NOTE — ASSESSMENT & PLAN NOTE
Most recent A1c 8.1% 2 months ago, up from 7.8% 1 year prior  Urine/albumin normal, FLP normal in 05/2024  After prior OV in 05/2024, patient was started on Jardiance in light of rising A1c  Patient did not tolerate Jardiance, reportedly became very lethargic  She remains maintained on MFM 1000 mg bid alone    - Encouraged lifestyle and dietary modifications  - A1c is largely appropriate for her age, favor against aggressive glycemic control  - Repeat A1c at next visit

## 2024-08-21 NOTE — ASSESSMENT & PLAN NOTE
Vitamin D deficient in 06/2022  Has been prescribed vitamin D supplements however has not been able to obtain refills recently    - Recheck levels

## 2024-09-25 NOTE — TELEPHONE ENCOUNTER
Medicine Refill Request    Last Office Visit: 8/19/2024   Last Consult Visit: Visit date not found  Last Telemedicine Visit: Visit date not found    Next Appointment: 11/19/2024      Current Outpatient Medications:     alendronate (FOSAMAX) 70 mg tablet, Take 1 tablet (70 mg total) by mouth once a week. Take on an empty stomach and do not lie down for the next 30 min., Disp: 12 tablet, Rfl: 1    amLODIPine (NORVASC) 10 mg tablet, Take 1 tablet (10 mg total) by mouth once daily., Disp: 90 tablet, Rfl: 3    atorvastatin (LIPITOR) 20 mg tablet, Take 1 tablet (20 mg total) by mouth daily., Disp: 90 tablet, Rfl: 3    blood sugar diagnostic (glucose blood) strip, 1 strip daily and an additional dose as needed (as needed). Use to test Blood Glucose daily and as needed, Disp: 100 strip, Rfl: 1    blood-glucose meter misc, 1 kit See admin instr. Use to test Blood Glucose as directed, Disp: 1 each, Rfl: 0    cholecalciferol, vitamin D3, 25 mcg (1,000 unit) capsule, TAKE 1 CAPSULE BY MOUTH EVERY DAY Strength: 25 mcg (1,000 unit), Disp: 30 capsule, Rfl: 6    empagliflozin (JARDIANCE) 10 mg tablet, Take 1 tablet (10 mg total) by mouth daily., Disp: 90 tablet, Rfl: 1    hydrochlorothiazide 12.5 mg tablet, Take 1 tablet (12.5 mg total) by mouth daily., Disp: 90 tablet, Rfl: 3    lancets misc, 1 Lancet daily and an additional dose as needed (as needed). Use to test Blood Glucose daily and as needed, Disp: 100 each, Rfl: 1    metFORMIN (GLUCOPHAGE) 500 mg tablet, Take 2 tablets (1,000 mg total) by mouth 2 (two) times a day with meals., Disp: 360 tablet, Rfl: 1    metoprolol succinate XL (TOPROL-XL) 50 mg 24 hr tablet, Take 1 tablet (50 mg total) by mouth daily., Disp: 90 tablet, Rfl: 3    miscellaneous medical supply (BLOOD PRESSURE CUFF) misc, 1 each by Not Applicable route daily., Disp: , Rfl:       BP Readings from Last 3 Encounters:   08/19/24 (!) 158/64   05/28/24 130/60   06/14/23 135/60       Recent Lab results:  Lab Results    Component Value Date    CHOL 131 05/28/2024   ,   Lab Results   Component Value Date    HDL 74 05/28/2024   ,   Lab Results   Component Value Date    LDLCALC 43 05/28/2024   ,   Lab Results   Component Value Date    TRIG 72 05/28/2024        Lab Results   Component Value Date    GLUCOSE 168 (H) 06/14/2023   ,   Lab Results   Component Value Date    HGBA1C 8.1 (H) 05/28/2024         Lab Results   Component Value Date    CREATININE 1.04 (H) 06/14/2023       Lab Results   Component Value Date    TSH 0.895 08/19/2024           Lab Results   Component Value Date    HGBA1C 8.1 (H) 05/28/2024

## 2024-09-26 RX ORDER — METFORMIN HYDROCHLORIDE 500 MG/1
1000 TABLET ORAL 2 TIMES DAILY WITH MEALS
Qty: 360 TABLET | Refills: 1 | Status: SHIPPED | OUTPATIENT
Start: 2024-09-26

## 2024-10-15 NOTE — ASSESSMENT & PLAN NOTE
On amlodipine, HCTZ, metoprolol  BP in office 158/64    - Consider increasing HCTZ dose at next visit however risks include hyponatremia with resultant encephalopathy, dehydration in elderly  - Adjust BP medication doses with caution given fall risk in the elderly who is now diagnosed with osteoporosis   verbal cues/1 person assist

## 2024-11-19 ENCOUNTER — TELEPHONE (OUTPATIENT)
Dept: INTERNAL MEDICINE | Facility: HOSPITAL | Age: 84
End: 2024-11-19
Payer: COMMERCIAL

## 2024-11-19 NOTE — TELEPHONE ENCOUNTER
Pt missed the appt because she was not feeling well. Pt had flu like symptoms according to there caregiver. Another appt was scheduled for January, but the caregiver may call to possible have the pt seen earlier.

## 2025-01-27 ENCOUNTER — APPOINTMENT (OUTPATIENT)
Dept: LAB | Facility: HOSPITAL | Age: 85
End: 2025-01-27
Payer: COMMERCIAL

## 2025-01-27 ENCOUNTER — OFFICE VISIT (OUTPATIENT)
Dept: INTERNAL MEDICINE | Facility: HOSPITAL | Age: 85
End: 2025-01-27
Payer: COMMERCIAL

## 2025-01-27 VITALS
OXYGEN SATURATION: 98 % | BODY MASS INDEX: 21.59 KG/M2 | SYSTOLIC BLOOD PRESSURE: 141 MMHG | DIASTOLIC BLOOD PRESSURE: 64 MMHG | HEIGHT: 59 IN | RESPIRATION RATE: 18 BRPM | WEIGHT: 107.1 LBS | TEMPERATURE: 97.2 F | HEART RATE: 61 BPM

## 2025-01-27 DIAGNOSIS — E55.9 VITAMIN D DEFICIENCY: ICD-10-CM

## 2025-01-27 DIAGNOSIS — E78.5 HYPERLIPIDEMIA, UNSPECIFIED: ICD-10-CM

## 2025-01-27 DIAGNOSIS — E11.9 TYPE 2 DIABETES MELLITUS WITHOUT COMPLICATION, WITHOUT LONG-TERM CURRENT USE OF INSULIN (CMS/HCC): Primary | ICD-10-CM

## 2025-01-27 DIAGNOSIS — E78.5 HYPERLIPIDEMIA, UNSPECIFIED HYPERLIPIDEMIA TYPE: ICD-10-CM

## 2025-01-27 DIAGNOSIS — M81.0 AGE-RELATED OSTEOPOROSIS WITHOUT CURRENT PATHOLOGICAL FRACTURE: ICD-10-CM

## 2025-01-27 DIAGNOSIS — E83.52 HYPERCALCEMIA: ICD-10-CM

## 2025-01-27 DIAGNOSIS — I10 ESSENTIAL HYPERTENSION: ICD-10-CM

## 2025-01-27 PROCEDURE — 3008F BODY MASS INDEX DOCD: CPT

## 2025-01-27 PROCEDURE — 3077F SYST BP >= 140 MM HG: CPT

## 2025-01-27 PROCEDURE — 99213 OFFICE O/P EST LOW 20 MIN: CPT | Mod: GE,GC

## 2025-01-27 PROCEDURE — 3078F DIAST BP <80 MM HG: CPT

## 2025-01-27 ASSESSMENT — PAIN SCALES - GENERAL: PAINLEVEL_OUTOF10: 0-NO PAIN

## 2025-01-27 NOTE — PROGRESS NOTES
[facility]  Internal Medicine Progress Note         Assessment & Plan  Type 2 diabetes mellitus without complication, without long-term current use of insulin (CMS/[facility])  Most recent A1c 8.1%   Regimen of MFM 1000 mg bid     - Encouraged lifestyle and dietary modifications  - Check A1c, BMP  - Referral to [facility] services       Essential hypertension  Maintained on Amlodipine 10mg & HCTZ 12.5mg  BP in office today 141/64    - continue current regimen as long as calcium within normal limits (was elevated 1.5 years ago). If elevated, will likely change HCTZ        Age-related osteoporosis without current pathological fracture  DEXA scan in 05/2024 showed T-score -2.5 in femoral neck consistent with osteoporosis  Non compliant with Alendronate  Patient has not seen a dentist in >2yrs; no eGFR in the last 1.5 years    - check BMP   - encouraged patient to see dentist  - if both of the above are ok, will proceed with bisphosphonate therapy       Hyperlipidemia, unspecified hyperlipidemia type  Home regimen of atorvastatin 20mg daily    - check lipid panel        Vitamin D deficiency  Holding vitamin D supplementation per Nephrology         Health care maintenance: up to date    No follow-ups on file.  At next visit: assess readiness for alendronate      Pratik Bowser MD        VAUGHN Bejarano is a 83 y.o. year-old female, primary patient of Dr. Graham with a past medical history of cataracts, DM2, HTN, osteopenia, hearing loss, vitamin D deficiency, and mild cognitive impairment who presents for routine follow up.     Last seen by Dr Miller 8/19/2024 for follow up. BP elevated at the time to 158/64, no changes at the time. A1c elevated to 8.1%. Noted to have osteoporosis, started on Alendronate.     Interval History: N/A     Osteoporosis/Falls  - one episode of fainting after long haul travel otherwise no travel   - no fractures  - not taking alendronate yet  - 2 years  - has not seen  "dentist in 2 years    Diabetes   - does not measure sugars anymore however was doing so in September & sugars ranged in the low 100s    No complaints otherwise. Patient is active, has a good diet       PHYSICAL EXAMINATION   Visit Vitals  BP (!) 141/64 (BP Location: Right upper arm, Patient Position: Sitting)   Pulse 61   Temp 36.2 °C (97.2 °F) (Temporal)   Resp 18   Ht 1.499 m (4' 11\")   Wt 48.6 kg (107 lb 1.6 oz)   SpO2 98%   BMI 21.63 kg/m²       Physical Exam  Constitutional:       General: She is not in acute distress.     Appearance: She is normal weight.      Comments: Pleasant, well-appearing   HENT:      Head: Normocephalic and atraumatic.      Mouth/Throat:      Mouth: Mucous membranes are moist.   Eyes:      Extraocular Movements: Extraocular movements intact.   Cardiovascular:      Rate and Rhythm: Normal rate.      Pulses: Normal pulses.   Pulmonary:      Effort: Pulmonary effort is normal.   Abdominal:      General: There is no distension.      Palpations: Abdomen is soft.      Tenderness: There is no abdominal tenderness.   Musculoskeletal:      Right lower leg: No edema.      Left lower leg: No edema.   Skin:     General: Skin is warm and dry.   Neurological:      Mental Status: She is alert. Mental status is at baseline.       LABS / IMAGING / STUDIES   Labs Reviewed    Studies/Imaging Reviewed       MEDICATIONS      Current Outpatient Medications   Medication Instructions    alendronate (FOSAMAX) 70 mg, oral, Weekly, Take on an empty stomach and do not lie down for the next 30 min.    amLODIPine (NORVASC) 10 mg, oral, Daily (8a)    atorvastatin (LIPITOR) 20 mg, oral, Daily    blood sugar diagnostic (glucose blood) strip 1 strip, miscellaneous (specify), Daily AND PRN, Use to test Blood Glucose daily and as needed    blood-glucose meter misc 1 kit, miscellaneous (specify), See admin instructions, Use to test Blood Glucose as directed    cholecalciferol, vitamin D3, 25 mcg (1,000 unit) capsule TAKE 1 " CAPSULE BY MOUTH EVERY DAY  Strength: 25 mcg (1,000 unit)    empagliflozin (JARDIANCE) 10 mg, oral, Daily    hydrochlorothiazide 12.5 mg, oral, Daily    lancets misc 1 Lancet, miscellaneous (specify), Daily AND PRN, Use to test Blood Glucose daily and as needed    metFORMIN (GLUCOPHAGE) 1,000 mg, oral, 2 times daily with meals    metoprolol succinate XL (TOPROL-XL) 50 mg, oral, Daily    miscellaneous medical supply (BLOOD PRESSURE CUFF) misc 1 each, Not Applicable, Daily

## 2025-01-27 NOTE — ASSESSMENT & PLAN NOTE
DEXA scan in 05/2024 showed T-score -2.5 in femoral neck consistent with osteoporosis  Non compliant with Alendronate  Patient has not seen a dentist in >2yrs; no eGFR in the last 1.5 years    - check BMP   - encouraged patient to see dentist  - if both of the above are ok, will proceed with bisphosphonate therapy

## 2025-01-27 NOTE — ASSESSMENT & PLAN NOTE
Most recent A1c 8.1%   Regimen of MFM 1000 mg bid     - Encouraged lifestyle and dietary modifications  - Check A1c, BMP  - Referral to [facility] services

## 2025-01-27 NOTE — ASSESSMENT & PLAN NOTE
Maintained on Amlodipine 10mg & HCTZ 12.5mg  BP in office today 141/64    - continue current regimen as long as calcium within normal limits (was elevated 1.5 years ago). If elevated, will likely change HCTZ

## 2025-01-29 NOTE — RESULT ENCOUNTER NOTE
Stable BMP.  Normalized calcium.  Normal lipid panel, normal vitamin D.  A1c stable compared to prior.  Will wait for dental assessment prior to starting Fosamax.

## 2025-01-30 LAB
25(OH)D3+25(OH)D2 SERPL-MCNC: 46.4 NG/ML (ref 30–100)
BUN SERPL-MCNC: 32 MG/DL (ref 8–27)
BUN/CREAT SERPL: 29 (ref 12–28)
CALCIUM SERPL-MCNC: 9.8 MG/DL (ref 8.7–10.3)
CHLORIDE SERPL-SCNC: 101 MMOL/L (ref 96–106)
CHOLEST SERPL-MCNC: 123 MG/DL (ref 100–199)
CO2 SERPL-SCNC: 21 MMOL/L (ref 20–29)
CREAT SERPL-MCNC: 1.09 MG/DL (ref 0.57–1)
EGFRCR SERPLBLD CKD-EPI 2021: 51 ML/MIN/1.73
GLUCOSE SERPL-MCNC: 136 MG/DL (ref 70–99)
HBA1C MFR BLD: 8.1 % (ref 4.8–5.6)
HDLC SERPL-MCNC: 68 MG/DL
LDLC SERPL CALC-MCNC: 41 MG/DL (ref 0–99)
POTASSIUM SERPL-SCNC: 4.9 MMOL/L (ref 3.5–5.2)
SODIUM SERPL-SCNC: 141 MMOL/L (ref 134–144)
SPECIMEN STATUS: NORMAL
TRIGL SERPL-MCNC: 64 MG/DL (ref 0–149)
VLDLC SERPL CALC-MCNC: 14 MG/DL (ref 5–40)

## 2025-03-27 ENCOUNTER — TELEPHONE (OUTPATIENT)
Dept: INTERNAL MEDICINE | Facility: HOSPITAL | Age: 85
End: 2025-03-27
Payer: COMMERCIAL

## 2025-03-27 NOTE — TELEPHONE ENCOUNTER
Pt son calling on her behalf. Requesting pt A1c be faxed to dentist for appt today. Fax sent to [phone].

## 2025-04-03 ENCOUNTER — TELEPHONE (OUTPATIENT)
Dept: INTERNAL MEDICINE | Facility: HOSPITAL | Age: 85
End: 2025-04-03
Payer: COMMERCIAL

## 2025-04-03 RX ORDER — OSELTAMIVIR PHOSPHATE 75 MG/1
75 CAPSULE ORAL 2 TIMES DAILY
Qty: 10 CAPSULE | Refills: 0 | Status: SHIPPED | OUTPATIENT
Start: 2025-04-03 | End: 2025-04-08

## 2025-04-03 NOTE — TELEPHONE ENCOUNTER
Bharath calling on behalf of pt. Stated everyone in the house tested positive for flu and stated it was advised pt have tamiflu prescribed.

## 2025-04-03 NOTE — TELEPHONE ENCOUNTER
Spoke with Bharath. He stated that the patient's daughter (who the patient lives with the patient) tested positive for flu B. They were advised by the daughter's doctor to have everyone in the house be given tamiflu. Patient is currently asymptomatic. Denied any fevers, chills, or SOB. Please send tamiflu to [facility] in [place] or any questions call Bharath at [phone]

## 2025-06-10 ENCOUNTER — TELEPHONE (OUTPATIENT)
Dept: INTERNAL MEDICINE | Facility: HOSPITAL | Age: 85
End: 2025-06-10
Payer: COMMERCIAL

## 2025-06-10 RX ORDER — METFORMIN HYDROCHLORIDE 500 MG/1
1000 TABLET ORAL
Qty: 360 TABLET | Refills: 1 | Status: SHIPPED | OUTPATIENT
Start: 2025-06-10

## 2025-06-10 NOTE — TELEPHONE ENCOUNTER
Medicine Refill Request    Last Office Visit: 1/27/2025   Last Consult Visit: Visit date not found  Last Telemedicine Visit: Visit date not found    Next Appointment: Visit date not found      Current Outpatient Medications:     alendronate (FOSAMAX) 70 mg tablet, Take 1 tablet (70 mg total) by mouth once a week. Take on an empty stomach and do not lie down for the next 30 min. (Patient not taking: Reported on 1/27/2025), Disp: 12 tablet, Rfl: 1    amLODIPine (NORVASC) 10 mg tablet, Take 1 tablet (10 mg total) by mouth once daily., Disp: 90 tablet, Rfl: 3    atorvastatin (LIPITOR) 20 mg tablet, Take 1 tablet (20 mg total) by mouth daily., Disp: 90 tablet, Rfl: 3    blood sugar diagnostic (glucose blood) strip, 1 strip daily and an additional dose as needed (as needed). Use to test Blood Glucose daily and as needed, Disp: 100 strip, Rfl: 1    blood-glucose meter misc, 1 kit See admin instr. Use to test Blood Glucose as directed, Disp: 1 each, Rfl: 0    cholecalciferol, vitamin D3, 25 mcg (1,000 unit) capsule, TAKE 1 CAPSULE BY MOUTH EVERY DAY Strength: 25 mcg (1,000 unit), Disp: 30 capsule, Rfl: 6    hydrochlorothiazide 12.5 mg tablet, Take 1 tablet (12.5 mg total) by mouth daily., Disp: 90 tablet, Rfl: 3    lancets misc, 1 Lancet daily and an additional dose as needed (as needed). Use to test Blood Glucose daily and as needed, Disp: 100 each, Rfl: 1    metFORMIN (GLUCOPHAGE) 500 mg tablet, Take 2 tablets (1,000 mg total) by mouth 2 (two) times a day with meals., Disp: 360 tablet, Rfl: 1    metoprolol succinate XL (TOPROL-XL) 50 mg 24 hr tablet, Take 1 tablet (50 mg total) by mouth daily., Disp: 90 tablet, Rfl: 3    miscellaneous medical supply (BLOOD PRESSURE CUFF) misc, 1 each by Not Applicable route daily., Disp: , Rfl:     BP Readings from Last 3 Encounters:   01/27/25 (!) 141/64   08/19/24 (!) 158/64   05/28/24 130/60       Recent Lab results:  Lab Results   Component Value Date    CHOL 123 01/27/2025   ,   Lab  Results   Component Value Date    HDL 68 01/27/2025   ,   Lab Results   Component Value Date    LDLCALC 41 01/27/2025   ,   Lab Results   Component Value Date    TRIG 64 01/27/2025        Lab Results   Component Value Date    GLUCOSE 136 (H) 01/27/2025   ,   Lab Results   Component Value Date    HGBA1C 8.1 (H) 01/27/2025         Lab Results   Component Value Date    CREATININE 1.09 (H) 01/27/2025       Lab Results   Component Value Date    TSH 0.895 08/19/2024           Lab Results   Component Value Date    HGBA1C 8.1 (H) 01/27/2025

## 2025-06-10 NOTE — TELEPHONE ENCOUNTER
Pt son calling, requesting a referral for a neurologist. Stated when we call back with the info it is ok to lvm if there is no answer.

## 2025-06-12 ENCOUNTER — DOCUMENTATION (OUTPATIENT)
Dept: INTERNAL MEDICINE | Facility: HOSPITAL | Age: 85
End: 2025-06-12
Payer: COMMERCIAL

## 2025-06-12 NOTE — PROGRESS NOTES
Attempted to call patient's son back to discuss request for neurology referral. No answer, left VM to call or message me back.    Carrie Montgomery,  PGY-1  Pager #[phone]     Detail Level: Detailed Depth Of Biopsy: dermis Was A Bandage Applied: Yes Size Of Lesion In Cm: 0 Biopsy Type: H and E Biopsy Method: Dermablade Anesthesia Type: 1% lidocaine with epinephrine Anesthesia Volume In Cc: 0.5 Hemostasis: Drysol Wound Care: Petrolatum Dressing: bandage Destruction After The Procedure: No Type Of Destruction Used: Curettage Curettage Text: The wound bed was treated with curettage after the biopsy was performed. Cryotherapy Text: The wound bed was treated with cryotherapy after the biopsy was performed. Electrodesiccation Text: The wound bed was treated with electrodesiccation after the biopsy was performed. Electrodesiccation And Curettage Text: The wound bed was treated with electrodesiccation and curettage after the biopsy was performed. Silver Nitrate Text: The wound bed was treated with silver nitrate after the biopsy was performed. Lab: 240 Consent: Written consent was obtained and risks were reviewed including but not limited to scarring, infection, bleeding, scabbing, incomplete removal, nerve damage and allergy to anesthesia. Post-Care Instructions: I reviewed with the patient in detail post-care instructions. Patient is to keep the biopsy site dry overnight, and then apply bacitracin twice daily until healed. Patient may apply hydrogen peroxide soaks to remove any crusting. Notification Instructions: Patient will be notified of biopsy results. However, patient instructed to call the office if not contacted within 2 weeks. Billing Type: Third-Party Bill Information: Selecting Yes will display possible errors in your note based on the variables you have selected. This validation is only offered as a suggestion for you. PLEASE NOTE THAT THE VALIDATION TEXT WILL BE REMOVED WHEN YOU FINALIZE YOUR NOTE. IF YOU WANT TO FAX A PRELIMINARY NOTE YOU WILL NEED TO TOGGLE THIS TO 'NO' IF YOU DO NOT WANT IT IN YOUR FAXED NOTE.

## 2025-06-13 DIAGNOSIS — R41.3 MEMORY LOSS: Primary | ICD-10-CM

## 2025-07-08 ENCOUNTER — OFFICE VISIT (OUTPATIENT)
Dept: INTERNAL MEDICINE | Facility: HOSPITAL | Age: 85
End: 2025-07-08
Payer: COMMERCIAL

## 2025-07-08 VITALS
DIASTOLIC BLOOD PRESSURE: 84 MMHG | TEMPERATURE: 97.9 F | HEIGHT: 59 IN | WEIGHT: 105 LBS | HEART RATE: 55 BPM | RESPIRATION RATE: 18 BRPM | SYSTOLIC BLOOD PRESSURE: 136 MMHG | OXYGEN SATURATION: 99 % | BODY MASS INDEX: 21.17 KG/M2

## 2025-07-08 DIAGNOSIS — I25.10 CORONARY ARTERY DISEASE INVOLVING NATIVE CORONARY ARTERY OF NATIVE HEART WITHOUT ANGINA PECTORIS: ICD-10-CM

## 2025-07-08 DIAGNOSIS — H91.93 BILATERAL HEARING LOSS, UNSPECIFIED HEARING LOSS TYPE: ICD-10-CM

## 2025-07-08 DIAGNOSIS — E78.5 HYPERLIPIDEMIA, UNSPECIFIED HYPERLIPIDEMIA TYPE: ICD-10-CM

## 2025-07-08 DIAGNOSIS — M81.0 AGE-RELATED OSTEOPOROSIS WITHOUT CURRENT PATHOLOGICAL FRACTURE: ICD-10-CM

## 2025-07-08 DIAGNOSIS — E11.9 TYPE 2 DIABETES MELLITUS WITHOUT COMPLICATION, WITHOUT LONG-TERM CURRENT USE OF INSULIN (CMS/HCC): ICD-10-CM

## 2025-07-08 DIAGNOSIS — E55.9 VITAMIN D DEFICIENCY: ICD-10-CM

## 2025-07-08 DIAGNOSIS — R41.89 COGNITIVE IMPAIRMENT: ICD-10-CM

## 2025-07-08 DIAGNOSIS — I10 ESSENTIAL HYPERTENSION: Primary | ICD-10-CM

## 2025-07-08 PROBLEM — E87.5 HYPERKALEMIA: Status: RESOLVED | Noted: 2018-10-11 | Resolved: 2025-07-08

## 2025-07-08 PROBLEM — M85.89 OSTEOPENIA OF MULTIPLE SITES: Status: RESOLVED | Noted: 2019-09-12 | Resolved: 2025-07-08

## 2025-07-08 PROBLEM — Z12.31 SCREENING MAMMOGRAM, ENCOUNTER FOR: Status: RESOLVED | Noted: 2021-02-17 | Resolved: 2025-07-08

## 2025-07-08 PROBLEM — R92.8 ABNORMAL MAMMOGRAM: Status: RESOLVED | Noted: 2018-10-10 | Resolved: 2025-07-08

## 2025-07-08 PROCEDURE — 99214 OFFICE O/P EST MOD 30 MIN: CPT | Mod: GC

## 2025-07-08 PROCEDURE — 3075F SYST BP GE 130 - 139MM HG: CPT

## 2025-07-08 PROCEDURE — 3079F DIAST BP 80-89 MM HG: CPT

## 2025-07-08 PROCEDURE — 3008F BODY MASS INDEX DOCD: CPT

## 2025-07-08 RX ORDER — VIT C/E/ZN/COPPR/LUTEIN/ZEAXAN 250MG-90MG
CAPSULE ORAL
Qty: 30 CAPSULE | Refills: 6 | Status: SHIPPED | OUTPATIENT
Start: 2025-07-08

## 2025-07-08 RX ORDER — HYDROCHLOROTHIAZIDE 12.5 MG/1
12.5 TABLET ORAL DAILY
Qty: 90 TABLET | Refills: 3 | Status: SHIPPED | OUTPATIENT
Start: 2025-07-08 | End: 2026-07-03

## 2025-07-08 RX ORDER — DONEPEZIL HYDROCHLORIDE 5 MG/1
5 TABLET, FILM COATED ORAL NIGHTLY
Qty: 90 TABLET | Refills: 1 | Status: SHIPPED | OUTPATIENT
Start: 2025-07-08 | End: 2026-01-04

## 2025-07-08 RX ORDER — METOPROLOL SUCCINATE 50 MG/1
50 TABLET, EXTENDED RELEASE ORAL DAILY
Qty: 90 TABLET | Refills: 3 | Status: SHIPPED | OUTPATIENT
Start: 2025-07-08 | End: 2026-07-03

## 2025-07-08 RX ORDER — METFORMIN HYDROCHLORIDE 500 MG/1
1000 TABLET ORAL
Qty: 360 TABLET | Refills: 1 | Status: SHIPPED | OUTPATIENT
Start: 2025-07-08

## 2025-07-08 RX ORDER — AMLODIPINE BESYLATE 10 MG/1
10 TABLET ORAL
Qty: 90 TABLET | Refills: 3 | Status: SHIPPED | OUTPATIENT
Start: 2025-07-08

## 2025-07-08 RX ORDER — ALENDRONATE SODIUM 70 MG/1
70 TABLET ORAL WEEKLY
Qty: 12 TABLET | Refills: 1 | Status: SHIPPED | OUTPATIENT
Start: 2025-07-08 | End: 2025-07-08 | Stop reason: ALTCHOICE

## 2025-07-08 RX ORDER — ATORVASTATIN CALCIUM 20 MG/1
20 TABLET, FILM COATED ORAL DAILY
Qty: 90 TABLET | Refills: 3 | Status: SHIPPED | OUTPATIENT
Start: 2025-07-08 | End: 2026-07-03

## 2025-07-08 ASSESSMENT — PAIN SCALES - GENERAL: PAINLEVEL_OUTOF10: 0-NO PAIN

## 2025-07-08 ASSESSMENT — PATIENT HEALTH QUESTIONNAIRE - PHQ9: SUM OF ALL RESPONSES TO PHQ9 QUESTIONS 1 & 2: 0

## 2025-07-08 NOTE — ASSESSMENT & PLAN NOTE
Most recent A1c 8.1%   Regimen of MFM 1000 mg bid  Has made much more dietary modifications since the last time she was here      - Encouraged lifestyle and dietary modifications  - Check A1c, BMP, if still elevated would consider addition of Jardiance v sitagliptin, would avoid sulfonylureas given her age   - Referral to [facility] Ophthalmic services  Orders:    Ambulatory referral to [facility]; Future    Microalbumin / creatinine urine ratio; Future    Hemoglobin A1c; Future    Basic metabolic panel; Future

## 2025-07-08 NOTE — ASSESSMENT & PLAN NOTE
DEXA scan in 05/2024 showed T-score -2.5 in femoral neck consistent with osteoporosis  Non compliant with Alendronate not interested in oral medications, interested in injectable/infusions  Patient has seen dentist without issue    - Family wanting to see Rheumatology for injectable/infusion therapy   Orders:    Ambulatory referral to Rheumatology; Future

## 2025-07-08 NOTE — ASSESSMENT & PLAN NOTE
Maintained on Amlodipine 10mg, HCTZ 12.5mg, metoprolol 50 mg eli  BP in office today 172/72, recheck 136/84    - continue current regimen   Orders:    amLODIPine (NORVASC) 10 mg tablet; Take 1 tablet (10 mg total) by mouth once daily.    metoprolol succinate XL (TOPROL-XL) 50 mg 24 hr tablet; Take 1 tablet (50 mg total) by mouth daily.    hydrochlorothiazide 12.5 mg tablet; Take 1 tablet (12.5 mg total) by mouth daily.

## 2025-07-08 NOTE — ASSESSMENT & PLAN NOTE
Patient noted to need things repeated a couple of times at higher volume for her to hear. Patient is now wearing hearing aids and has been doing well from that sense.     - Continue hearing aids

## 2025-07-08 NOTE — PROGRESS NOTES
I performed a history and physical examination of the patient and discussed the management with the Resident. I reviewed the Resident's note and agree with the documented findings and plan of care, except for my comments below or within the additional notes today.      I reviewed plan for Osteoporosis treatment, in addition to further evaluation for cognitive decline including MRI and trial of donepezil in addition to Neurology referral.     Remainder per resident  Waldemar Smallwood, DO

## 2025-07-08 NOTE — ASSESSMENT & PLAN NOTE
DEXA scan in 05/2024 showed T-score -2.5 in femoral neck consistent with osteoporosis  Non compliant with Alendronate not interested in oral medications, would like to trial injectables  Patient has seen dentist this past year with no concerns     - Family would like referral to rheumatologist to discuss injectable/infusion medications

## 2025-07-08 NOTE — ASSESSMENT & PLAN NOTE
MMSE 23/30 in 05/2024  Family is concerned about the level of progression of memory loss, they have appointment in November but concerned she may develop too much to wait until then   B12, folate, TSH, RPR all wnl checked in 2024    - MRI brain  - Referral to [place] neurolog[facility]   - Donapezil initiation 5mg nightly    Orders:    Ambulatory referral to Neurology; Future    MRI BRAIN WITHOUT CONTRAST; Future

## 2025-07-08 NOTE — PROGRESS NOTES
[facility]  Internal Medicine Progress Note             Consent obtained from patient and all parties present in the room? yes    I have obtained the consent of everyone present in the room to make an audio recording of this visit to assist me in documenting the encounter in the EMR.       Assessment & Plan  Essential hypertension  Maintained on Amlodipine 10mg, HCTZ 12.5mg, metoprolol 50 mg eli  BP in office today 172/72, recheck 136/84    - continue current regimen   Orders:    amLODIPine (NORVASC) 10 mg tablet; Take 1 tablet (10 mg total) by mouth once daily.    metoprolol succinate XL (TOPROL-XL) 50 mg 24 hr tablet; Take 1 tablet (50 mg total) by mouth daily.    hydrochlorothiazide 12.5 mg tablet; Take 1 tablet (12.5 mg total) by mouth daily.      Hyperlipidemia, unspecified hyperlipidemia type  Home regimen of atorvastatin 20mg daily    - Continue atorvastatin          Type 2 diabetes mellitus without complication, without long-term current use of insulin (CMS/[facility])  Most recent A1c 8.1%   Regimen of MFM 1000 mg bid  Has made much more dietary modifications since the last time she was here      - Encouraged lifestyle and dietary modifications  - Check A1c, BMP, if still elevated would consider addition of Jardiance v sitagliptin, would avoid sulfonylureas given her age   - Referral to [facility] Ophthalmic services  Orders:    Ambulatory referral to [facility]; Future    Microalbumin / creatinine urine ratio; Future    Hemoglobin A1c; Future    Basic metabolic panel; Future      Vitamin D deficiency  Maintained on vitamin D supplementation    - Continue          Cognitive impairment  MMSE 23/30 in 05/2024  Family is concerned about the level of progression of memory loss, they have appointment in November but concerned she may develop too much to wait until then   B12, folate, TSH, RPR all wnl checked in 2024    - MRI brain  - Referral to [place] neurology   - Donapezil initiation 5mg  nightly    Orders:    Ambulatory referral to Neurology; Future    MRI BRAIN WITHOUT CONTRAST; Future    Bilateral hearing loss, unspecified hearing loss type  Patient noted to need things repeated a couple of times at higher volume for her to hear. Patient is now wearing hearing aids and has been doing well from that sense.     - Continue hearing aids          Age-related osteoporosis without current pathological fracture  DEXA scan in 05/2024 showed T-score -2.5 in femoral neck consistent with osteoporosis  Non compliant with Alendronate not interested in oral medications, interested in injectable/infusions  Patient has seen dentist without issue    - Family wanting to see Rheumatology for injectable/infusion therapy   Orders:    Ambulatory referral to Rheumatology; Future          Health care maintenance:   Due for Zoster, COVID  Due for uACR, foot exam, eye exam    - B10 referral given   - uACR ordered    No follow-ups on file.  At next visit: [facility]      Libby Mensah MD            Subjective     PCP: Dr. Graham    History of Present Illness  Grazyna Bejarano is a 83 y.o. year-old female, primary patient of Dr. Graham with a past medical history of cataracts, DM2, HTN, osteoporosis, hearing loss, vitamin D deficiency, and mild cognitive impairment who presents for routine follow up. Patient was last seen on 1/27/2025    The patient presents for a routine follow-up. She is accompanied by her son-in-law and daughter.    She has been experiencing a rapid decline in cognitive function, particularly with short-term memory loss. An appointment with a neurologist is scheduled for 11/2025. She has not consulted a neuropsychiatrist or undergone any brain imaging. She reports no visual changes, headaches, numbness, or weakness. She uses hearing aids and reports no abdominal pain.    Her last DEXA scan was conducted in 2024, which indicated osteoporosis. She has completed all necessary dental work and is due for dentures. She is  considering Prolia injections every six months as a potential treatment option. She does not take any over-the-counter calcium supplements but does take a multivitamin. She is also taking vitamin D.    Her blood pressure tends to rise during doctor's visits. She is currently on amlodipine 10 mg, hydrochlorothiazide 12.5 mg, and metoprolol for blood pressure management.    She is on metformin for diabetes management and has made dietary changes to increase protein and reduce carbohydrate intake. She has not had an eye exam recently. She has previously tried Januvia but discontinued it due to lightheadedness.              Objective     There were no vitals filed for this visit.    Wt Readings from Last 8 Encounters:   01/27/25 48.6 kg (107 lb 1.6 oz)   08/19/24 47.1 kg (103 lb 14.4 oz)   05/28/24 48 kg (105 lb 14.4 oz)   06/14/23 50.6 kg (111 lb 8 oz)   03/15/23 51.8 kg (114 lb 3.2 oz)   02/20/23 50.8 kg (112 lb)   06/15/22 49.4 kg (109 lb)   03/09/22 48.6 kg (107 lb 1.6 oz)       Physical Exam  General: Alert, not in acute distress  Eyes: AUGUSTUS  HENT: MMM, atraumatic normocephalic  Respiratory: Clear to auscultation, no wheezing, rales or rhonchi  Cardiovascular: Regular rate and rhythm, no murmurs, rubs, or gallops  Extremities: No swelling in the ankles  Neuro: No focal deficits  Psych: Normal behavior      Results  Last BMP:  Lab Results   Component Value Date     01/27/2025    K 4.9 01/27/2025     01/27/2025    CO2 21 01/27/2025    BUN 32 (H) 01/27/2025    CREATININE 1.09 (H) 01/27/2025       Last CBC:  Lab Results   Component Value Date    WBC 5.5 06/15/2022    HGB 11.5 06/15/2022    HCT 36.4 06/15/2022    MCV 87 06/15/2022     06/15/2022       Last Lipids:  Lab Results   Component Value Date    CHOL 123 01/27/2025    HDL 68 01/27/2025    LDLCALC 41 01/27/2025    TRIG 64 01/27/2025       Last three HgbA1c:  Lab Results   Component Value Date    HGBA1C 8.1 (H) 01/27/2025    HGBA1C 8.1 (H)  05/28/2024    HGBA1C 7.8 (H) 06/14/2023       Last Microalbumin:  Lab Results   Component Value Date    MICROALBUR 15.9 05/28/2024       Last TSH:  Lab Results   Component Value Date    TSH 0.895 08/19/2024       Last Vitamin D:  Lab Results   Component Value Date    XIJA77IZ 46.4 01/27/2025       DEXA BONE DENSITY  Narrative: CLINICAL HISTORY: Screening for osteoporosis, postmenopausal. History of  diabetes. M [age]    COMMENT:    Scans of the lumbar spine and both hips were obtained and comparison  was made to a prior study dated 11/6/2015. The bone mineral density was  calculated for all the levels scanned.  The patient's bone mineral density was  referenced to both young normal patients (T-score) as well as age matched  patients (Z-score).  For the evaluation of osteoporosis, it is recommended that  the patient's bone mineral density be compared to young normal patients or the  T-score.    Current World Health Organization criteria for the diagnosis of osteoporosis are  as follows:  1.  Up to 1 standard deviation below normal compared to young normal patients -  normal.  2.  Between 1 and 2.5 standard deviations below normal compared to young normal  patients - low bone mass or osteopenia.  3.  Greater than or equal to 2.5 standard deviations below normal compared to  young normal patients - osteoporosis.    The DEXA was performed on [employer] equipment.    AP lumbar spine T-score       L1-L4                         -0.3  Corresponding bone mineral density:       1.162 g/cm2.  There has been 3.7% worsening in the lumbar spine since the previous study.    AP left hip lowest T-score:    Femoral neck      -2.5  Corresponding bone mineral density:       0.697 g/cm2.  There has been 10.6% worsening in the total hip since the previous study.    AP right hip lowest T-score:  Femoral neck      -2.3  Corresponding bone mineral density:       0.722 g/cm2.  There has been 7.0% worsening in the total hip  since the previous study.  Impression: IMPRESSION: Osteoporosis  In accordance with [place] Act 112,  the patient will receive a letter notifying them  to follow up with their physician.    This patient's diagnosis has changed from osteopenia to osteoporosis which is a  significant change.    This service rendered by Cristine Barney PA-C.    I certify that I have personally reviewed this examination and agree with Cristine Barney's report.  Alejandro Cline M.D.            Current Outpatient Medications   Medication Instructions    alendronate (FOSAMAX) 70 mg, oral, Weekly, Take on an empty stomach and do not lie down for the next 30 min.    amLODIPine (NORVASC) 10 mg, oral, Daily (8a)    atorvastatin (LIPITOR) 20 mg, oral, Daily    blood sugar diagnostic (glucose blood) strip 1 strip, miscellaneous (specify), Daily AND PRN, Use to test Blood Glucose daily and as needed    blood-glucose meter misc 1 kit, miscellaneous (specify), See admin instructions, Use to test Blood Glucose as directed    cholecalciferol, vitamin D3, 25 mcg (1,000 unit) capsule TAKE 1 CAPSULE BY MOUTH EVERY DAY  Strength: 25 mcg (1,000 unit)    hydrochlorothiazide 12.5 mg, oral, Daily    lancets misc 1 Lancet, miscellaneous (specify), Daily AND PRN, Use to test Blood Glucose daily and as needed    metFORMIN (GLUCOPHAGE) 1,000 mg, oral, 2 times daily with breakfast and dinner    metoprolol succinate XL (TOPROL-XL) 50 mg, oral, Daily    miscellaneous medical supply (BLOOD PRESSURE CUFF) misc 1 each, Daily

## 2025-07-08 NOTE — PATIENT INSTRUCTIONS
Dear Ms. Velazquez    It was a pleasure seeing you in the office     Please try to follow with [facility]  Please start donepazil 5 mg nightly for your memory  Please obtain an MRI of your brain  Please see an eye doctor   Please follow up with rheumatology for your osteoporosis   Please get your lab work done      Please call or send [employer] message with any questions or concerns!    Best  Dr. Mensah

## 2025-08-26 ENCOUNTER — TELEPHONE (OUTPATIENT)
Dept: INTERNAL MEDICINE | Facility: HOSPITAL | Age: 85
End: 2025-08-26
Payer: COMMERCIAL